# Patient Record
Sex: FEMALE | Race: OTHER | NOT HISPANIC OR LATINO | URBAN - METROPOLITAN AREA
[De-identification: names, ages, dates, MRNs, and addresses within clinical notes are randomized per-mention and may not be internally consistent; named-entity substitution may affect disease eponyms.]

---

## 2017-09-08 ENCOUNTER — OUTPATIENT (OUTPATIENT)
Dept: OUTPATIENT SERVICES | Facility: HOSPITAL | Age: 72
LOS: 1 days | End: 2017-09-08
Payer: MEDICARE

## 2017-09-08 DIAGNOSIS — Z52.000: ICD-10-CM

## 2017-09-08 PROCEDURE — 86901 BLOOD TYPING SEROLOGIC RH(D): CPT

## 2017-09-08 PROCEDURE — 36415 COLL VENOUS BLD VENIPUNCTURE: CPT

## 2017-09-08 PROCEDURE — 86850 RBC ANTIBODY SCREEN: CPT

## 2017-09-08 PROCEDURE — 86900 BLOOD TYPING SEROLOGIC ABO: CPT

## 2019-05-29 ENCOUNTER — APPOINTMENT (OUTPATIENT)
Dept: UROGYNECOLOGY | Facility: CLINIC | Age: 74
End: 2019-05-29
Payer: MEDICARE

## 2019-05-29 VITALS
BODY MASS INDEX: 23.95 KG/M2 | SYSTOLIC BLOOD PRESSURE: 126 MMHG | WEIGHT: 122 LBS | HEIGHT: 60 IN | DIASTOLIC BLOOD PRESSURE: 80 MMHG

## 2019-05-29 DIAGNOSIS — N39.46 MIXED INCONTINENCE: ICD-10-CM

## 2019-05-29 DIAGNOSIS — R35.0 FREQUENCY OF MICTURITION: ICD-10-CM

## 2019-05-29 DIAGNOSIS — N99.3 PROLAPSE OF VAGINAL VAULT AFTER HYSTERECTOMY: ICD-10-CM

## 2019-05-29 DIAGNOSIS — R39.15 URGENCY OF URINATION: ICD-10-CM

## 2019-05-29 LAB
BILIRUB UR QL STRIP: NORMAL
CLARITY UR: CLEAR
COLLECTION METHOD: NORMAL
GLUCOSE UR-MCNC: NORMAL
HCG UR QL: 0.2 EU/DL
HGB UR QL STRIP.AUTO: NORMAL
KETONES UR-MCNC: NORMAL
LEUKOCYTE ESTERASE UR QL STRIP: NORMAL
NITRITE UR QL STRIP: NORMAL
PH UR STRIP: 6.5
PROT UR STRIP-MCNC: NORMAL
SP GR UR STRIP: 1.01

## 2019-05-29 PROCEDURE — 51701 INSERT BLADDER CATHETER: CPT

## 2019-05-29 PROCEDURE — 99204 OFFICE O/P NEW MOD 45 MIN: CPT | Mod: 25

## 2019-05-29 NOTE — HISTORY OF PRESENT ILLNESS
[Rectal Prolapse] : none [Urinary Frequency] : none [Constipation Obstructed Defecation] : none [Feelings Of Urinary Urgency] : daily [x3+] : three or more  times a night [Urinary Tract Infection] : daily [Unable To Restrain Bowel Movement] : rare [Incomplete Emptying Of Stool] : none [Stool Visible Blood] : none [] : none [Sexual Dysfunction, NOS] : none [de-identified] : uses 2 pads daily [FreeTextEntry7] : has daily BMs and doesn’t need to strain once she inserts fingers in rectum to start BM [de-identified] : sexually active [FreeTextEntry1] : \par PMH: DM2, HLD\par PSH: total abdominal hysterectomy and appendectomy for ruptured appendix 1980s, benign papilloma \par Social History: currently retired, \par \par daily fluid intake: 24 oz coffee, 24 oz monet water, soda, 2 c water, 1 c OJ

## 2019-05-29 NOTE — DISCUSSION/SUMMARY
[FreeTextEntry1] : \par 1. vaginal vault prolapse: Computer generated images were used to demonstrate her prolapse as well as explain treatment options. We reviewed management options for her prolapse including: observation, pelvic floor exercises, pessary, and surgical management. We reviewed various surgical management options including vaginal, laparoscopic/robotic, abdominal procedures. She would like to try a pessary and will RTO for pessary fitting. We will fit with a pessary she can remove/insert on her own as she is sexually active. Will try ring with support, donut vs cube pessary. \par \par 2. Mixed urinary incontinence, predominant urgency component: We discussed possible etiologies of her symptoms including both stress urinary incontinence and overactive bladder. I recommend she start behavioral and fluid modifications. We will also reassess her urinary symptoms once the prolapse is reduced. If overactive bladder/UUI symptoms persist, will consider medications. If she opts for surgery in future, will proceed with urodynamics prior to surgical planning. \par \par 3. Will reassess defecatory symptoms (digitation) with prolapse reduced

## 2019-05-29 NOTE — PHYSICAL EXAM
[No Acute Distress] : in no acute distress [Oriented x3] : oriented to person, place, and time [Normal Memory] : ~T memory was ~L unimpaired [Warm and Dry] : was warm and dry to touch [Normal Gait] : gait was normal [Vulvar Atrophy] : vulvar atrophy [Labia Majora] : were normal [Labia Minora] : were normal [Atrophy] : atrophy [No Bleeding] : there was no active vaginal bleeding [Normal] : no abnormalities [Aa ____] : Aa [unfilled] [Ba ____] : Ba [unfilled] [GH ____] : GH [unfilled] [C ____] : C [unfilled] [TVL ____] : TVL  [unfilled] [PB ____] : PB [unfilled] [Ap ____] : Ap [unfilled] [Bp ____] : Bp [unfilled] [Absent] : absent [Tenderness] : ~T no ~M abdominal tenderness observed [Distended] : not distended [H/Smegaly] : no hepatosplenomegaly [Inguinal LAD] : no adenopathy was noted in the inguinal lymph nodes

## 2019-05-30 ENCOUNTER — RECORD ABSTRACTING (OUTPATIENT)
Age: 74
End: 2019-05-30

## 2019-05-30 ENCOUNTER — RESULT REVIEW (OUTPATIENT)
Age: 74
End: 2019-05-30

## 2019-05-30 DIAGNOSIS — Z63.4 DISAPPEARANCE AND DEATH OF FAMILY MEMBER: ICD-10-CM

## 2019-05-30 DIAGNOSIS — Z78.9 OTHER SPECIFIED HEALTH STATUS: ICD-10-CM

## 2019-05-30 DIAGNOSIS — Z83.3 FAMILY HISTORY OF DIABETES MELLITUS: ICD-10-CM

## 2019-05-30 DIAGNOSIS — Z86.39 PERSONAL HISTORY OF OTHER ENDOCRINE, NUTRITIONAL AND METABOLIC DISEASE: ICD-10-CM

## 2019-05-30 DIAGNOSIS — Z82.5 FAMILY HISTORY OF ASTHMA AND OTHER CHRONIC LOWER RESPIRATORY DISEASES: ICD-10-CM

## 2019-05-30 LAB
APPEARANCE: CLEAR
BACTERIA: NEGATIVE
BILIRUBIN URINE: NEGATIVE
BLOOD URINE: NEGATIVE
COLOR: NORMAL
GLUCOSE QUALITATIVE U: NEGATIVE
HYALINE CASTS: 0 /LPF
KETONES URINE: NEGATIVE
LEUKOCYTE ESTERASE URINE: NEGATIVE
MICROSCOPIC-UA: NORMAL
NITRITE URINE: NEGATIVE
PH URINE: 6.5
PROTEIN URINE: NEGATIVE
RED BLOOD CELLS URINE: 2 /HPF
SPECIFIC GRAVITY URINE: 1.02
SQUAMOUS EPITHELIAL CELLS: 0 /HPF
UROBILINOGEN URINE: NORMAL
WHITE BLOOD CELLS URINE: 0 /HPF

## 2019-05-30 RX ORDER — MULTIVITAMIN
TABLET ORAL
Refills: 0 | Status: ACTIVE | COMMUNITY

## 2019-05-30 RX ORDER — ASPIRIN 81 MG
600-200 TABLET, DELAYED RELEASE (ENTERIC COATED) ORAL
Refills: 0 | Status: ACTIVE | COMMUNITY

## 2019-05-30 SDOH — SOCIAL STABILITY - SOCIAL INSECURITY: DISSAPEARANCE AND DEATH OF FAMILY MEMBER: Z63.4

## 2019-05-31 ENCOUNTER — RESULT REVIEW (OUTPATIENT)
Age: 74
End: 2019-05-31

## 2019-06-27 ENCOUNTER — MESSAGE (OUTPATIENT)
Age: 74
End: 2019-06-27

## 2019-07-10 ENCOUNTER — APPOINTMENT (OUTPATIENT)
Dept: UROGYNECOLOGY | Facility: CLINIC | Age: 74
End: 2019-07-10

## 2019-11-26 LAB — BACTERIA UR CULT: NORMAL

## 2021-04-09 DIAGNOSIS — Z01.818 ENCOUNTER FOR OTHER PREPROCEDURAL EXAMINATION: ICD-10-CM

## 2021-04-10 ENCOUNTER — APPOINTMENT (OUTPATIENT)
Dept: DISASTER EMERGENCY | Facility: CLINIC | Age: 76
End: 2021-04-10

## 2022-04-19 ENCOUNTER — APPOINTMENT (OUTPATIENT)
Dept: ORTHOPEDIC SURGERY | Facility: CLINIC | Age: 77
End: 2022-04-19

## 2023-01-23 ENCOUNTER — EMERGENCY (EMERGENCY)
Facility: HOSPITAL | Age: 78
LOS: 1 days | Discharge: HOME | End: 2023-01-23
Attending: STUDENT IN AN ORGANIZED HEALTH CARE EDUCATION/TRAINING PROGRAM
Payer: MEDICARE

## 2023-01-23 VITALS
HEART RATE: 106 BPM | OXYGEN SATURATION: 99 % | DIASTOLIC BLOOD PRESSURE: 78 MMHG | RESPIRATION RATE: 16 BRPM | SYSTOLIC BLOOD PRESSURE: 145 MMHG | TEMPERATURE: 99 F | WEIGHT: 125 LBS

## 2023-01-23 PROCEDURE — 99285 EMERGENCY DEPT VISIT HI MDM: CPT

## 2023-01-24 VITALS
OXYGEN SATURATION: 100 % | RESPIRATION RATE: 16 BRPM | SYSTOLIC BLOOD PRESSURE: 120 MMHG | TEMPERATURE: 99 F | HEART RATE: 95 BPM | DIASTOLIC BLOOD PRESSURE: 66 MMHG

## 2023-01-24 LAB
A1C WITH ESTIMATED AVERAGE GLUCOSE RESULT: 6.8 % — HIGH (ref 4–5.6)
ALBUMIN SERPL ELPH-MCNC: 3.8 G/DL — SIGNIFICANT CHANGE UP (ref 3.3–5)
ALP SERPL-CCNC: 157 U/L — HIGH (ref 40–120)
ALT FLD-CCNC: 25 U/L — SIGNIFICANT CHANGE UP (ref 10–45)
ANION GAP SERPL CALC-SCNC: 12 MMOL/L — SIGNIFICANT CHANGE UP (ref 5–17)
APPEARANCE UR: ABNORMAL
AST SERPL-CCNC: 25 U/L — SIGNIFICANT CHANGE UP (ref 10–40)
B-OH-BUTYR SERPL-SCNC: 0.4 MMOL/L — SIGNIFICANT CHANGE UP
BACTERIA # UR AUTO: ABNORMAL
BASE EXCESS BLDV CALC-SCNC: -0.2 MMOL/L — SIGNIFICANT CHANGE UP (ref -2–3)
BASOPHILS # BLD AUTO: 0.02 K/UL — SIGNIFICANT CHANGE UP (ref 0–0.2)
BASOPHILS NFR BLD AUTO: 0.2 % — SIGNIFICANT CHANGE UP (ref 0–2)
BILIRUB SERPL-MCNC: 0.5 MG/DL — SIGNIFICANT CHANGE UP (ref 0.2–1.2)
BILIRUB UR-MCNC: NEGATIVE — SIGNIFICANT CHANGE UP
BLOOD GAS VENOUS - CREATININE: SIGNIFICANT CHANGE UP MG/DL (ref 0.5–1.3)
BUN SERPL-MCNC: 24 MG/DL — HIGH (ref 7–23)
CA-I SERPL-SCNC: 1.08 MMOL/L — LOW (ref 1.15–1.33)
CALCIUM SERPL-MCNC: 9.4 MG/DL — SIGNIFICANT CHANGE UP (ref 8.4–10.5)
CHLORIDE BLDV-SCNC: 109 MMOL/L — HIGH (ref 96–108)
CHLORIDE SERPL-SCNC: 107 MMOL/L — SIGNIFICANT CHANGE UP (ref 96–108)
CO2 BLDV-SCNC: 27 MMOL/L — HIGH (ref 22–26)
CO2 SERPL-SCNC: 22 MMOL/L — SIGNIFICANT CHANGE UP (ref 22–31)
COLOR SPEC: ABNORMAL
CREAT SERPL-MCNC: 0.9 MG/DL — SIGNIFICANT CHANGE UP (ref 0.5–1.3)
DIFF PNL FLD: ABNORMAL
EGFR: 65 ML/MIN/1.73M2 — SIGNIFICANT CHANGE UP
EOSINOPHIL # BLD AUTO: 0 K/UL — SIGNIFICANT CHANGE UP (ref 0–0.5)
EOSINOPHIL NFR BLD AUTO: 0 % — SIGNIFICANT CHANGE UP (ref 0–6)
EPI CELLS # UR: 7 /HPF — HIGH
ESTIMATED AVERAGE GLUCOSE: 148 MG/DL — HIGH (ref 68–114)
GAS PNL BLDV: 133 MMOL/L — LOW (ref 136–145)
GAS PNL BLDV: SIGNIFICANT CHANGE UP
GAS PNL BLDV: SIGNIFICANT CHANGE UP
GLUCOSE BLDV-MCNC: 157 MG/DL — HIGH (ref 70–99)
GLUCOSE SERPL-MCNC: 168 MG/DL — HIGH (ref 70–99)
GLUCOSE UR QL: ABNORMAL
HCO3 BLDV-SCNC: 25 MMOL/L — SIGNIFICANT CHANGE UP (ref 22–29)
HCT VFR BLD CALC: 40.9 % — SIGNIFICANT CHANGE UP (ref 34.5–45)
HCT VFR BLDA CALC: 40 % — SIGNIFICANT CHANGE UP (ref 34.5–46.5)
HGB BLD CALC-MCNC: 13.3 G/DL — SIGNIFICANT CHANGE UP (ref 11.7–16.1)
HGB BLD-MCNC: 13.1 G/DL — SIGNIFICANT CHANGE UP (ref 11.5–15.5)
HYALINE CASTS # UR AUTO: 4 /LPF — HIGH (ref 0–2)
IMM GRANULOCYTES NFR BLD AUTO: 0.5 % — SIGNIFICANT CHANGE UP (ref 0–0.9)
KETONES UR-MCNC: ABNORMAL
LACTATE BLDV-MCNC: 1.8 MMOL/L — SIGNIFICANT CHANGE UP (ref 0.5–2)
LEUKOCYTE ESTERASE UR-ACNC: ABNORMAL
LYMPHOCYTES # BLD AUTO: 0.88 K/UL — LOW (ref 1–3.3)
LYMPHOCYTES # BLD AUTO: 6.7 % — LOW (ref 13–44)
MAGNESIUM SERPL-MCNC: 2.4 MG/DL — SIGNIFICANT CHANGE UP (ref 1.6–2.6)
MCHC RBC-ENTMCNC: 28 PG — SIGNIFICANT CHANGE UP (ref 27–34)
MCHC RBC-ENTMCNC: 32 GM/DL — SIGNIFICANT CHANGE UP (ref 32–36)
MCV RBC AUTO: 87.4 FL — SIGNIFICANT CHANGE UP (ref 80–100)
MONOCYTES # BLD AUTO: 1.11 K/UL — HIGH (ref 0–0.9)
MONOCYTES NFR BLD AUTO: 8.5 % — SIGNIFICANT CHANGE UP (ref 2–14)
NEUTROPHILS # BLD AUTO: 11.01 K/UL — HIGH (ref 1.8–7.4)
NEUTROPHILS NFR BLD AUTO: 84.1 % — HIGH (ref 43–77)
NITRITE UR-MCNC: POSITIVE
NRBC # BLD: 0 /100 WBCS — SIGNIFICANT CHANGE UP (ref 0–0)
PCO2 BLDV: 44 MMHG — HIGH (ref 39–42)
PH BLDV: 7.37 — SIGNIFICANT CHANGE UP (ref 7.32–7.43)
PH UR: 6 — SIGNIFICANT CHANGE UP (ref 5–8)
PHOSPHATE SERPL-MCNC: 2.3 MG/DL — LOW (ref 2.5–4.5)
PLATELET # BLD AUTO: 209 K/UL — SIGNIFICANT CHANGE UP (ref 150–400)
PO2 BLDV: 26 MMHG — SIGNIFICANT CHANGE UP (ref 25–45)
POTASSIUM BLDV-SCNC: >10 MMOL/L — CRITICAL HIGH (ref 3.5–5.1)
POTASSIUM SERPL-MCNC: 4.6 MMOL/L — SIGNIFICANT CHANGE UP (ref 3.5–5.3)
POTASSIUM SERPL-SCNC: 4.6 MMOL/L — SIGNIFICANT CHANGE UP (ref 3.5–5.3)
PROT SERPL-MCNC: 8 G/DL — SIGNIFICANT CHANGE UP (ref 6–8.3)
PROT UR-MCNC: ABNORMAL
RAPID RVP RESULT: SIGNIFICANT CHANGE UP
RBC # BLD: 4.68 M/UL — SIGNIFICANT CHANGE UP (ref 3.8–5.2)
RBC # FLD: 14.9 % — HIGH (ref 10.3–14.5)
RBC CASTS # UR COMP ASSIST: 15 /HPF — HIGH (ref 0–4)
SAO2 % BLDV: 43.3 % — LOW (ref 67–88)
SARS-COV-2 RNA SPEC QL NAA+PROBE: SIGNIFICANT CHANGE UP
SODIUM SERPL-SCNC: 141 MMOL/L — SIGNIFICANT CHANGE UP (ref 135–145)
SP GR SPEC: 1.02 — SIGNIFICANT CHANGE UP (ref 1.01–1.02)
TROPONIN T, HIGH SENSITIVITY RESULT: 36 NG/L — SIGNIFICANT CHANGE UP (ref 0–51)
UROBILINOGEN FLD QL: NEGATIVE — SIGNIFICANT CHANGE UP
WBC # BLD: 13.09 K/UL — HIGH (ref 3.8–10.5)
WBC # FLD AUTO: 13.09 K/UL — HIGH (ref 3.8–10.5)
WBC UR QL: 641 /HPF — HIGH (ref 0–5)

## 2023-01-24 PROCEDURE — 36415 COLL VENOUS BLD VENIPUNCTURE: CPT

## 2023-01-24 PROCEDURE — 82330 ASSAY OF CALCIUM: CPT

## 2023-01-24 PROCEDURE — 85018 HEMOGLOBIN: CPT

## 2023-01-24 PROCEDURE — 82803 BLOOD GASES ANY COMBINATION: CPT

## 2023-01-24 PROCEDURE — 83036 HEMOGLOBIN GLYCOSYLATED A1C: CPT

## 2023-01-24 PROCEDURE — 85025 COMPLETE CBC W/AUTO DIFF WBC: CPT

## 2023-01-24 PROCEDURE — 83735 ASSAY OF MAGNESIUM: CPT

## 2023-01-24 PROCEDURE — 82947 ASSAY GLUCOSE BLOOD QUANT: CPT

## 2023-01-24 PROCEDURE — 80053 COMPREHEN METABOLIC PANEL: CPT

## 2023-01-24 PROCEDURE — 71046 X-RAY EXAM CHEST 2 VIEWS: CPT

## 2023-01-24 PROCEDURE — 84132 ASSAY OF SERUM POTASSIUM: CPT

## 2023-01-24 PROCEDURE — 82565 ASSAY OF CREATININE: CPT

## 2023-01-24 PROCEDURE — 0225U NFCT DS DNA&RNA 21 SARSCOV2: CPT

## 2023-01-24 PROCEDURE — 84484 ASSAY OF TROPONIN QUANT: CPT

## 2023-01-24 PROCEDURE — 84295 ASSAY OF SERUM SODIUM: CPT

## 2023-01-24 PROCEDURE — 85014 HEMATOCRIT: CPT

## 2023-01-24 PROCEDURE — 87186 SC STD MICRODIL/AGAR DIL: CPT

## 2023-01-24 PROCEDURE — 99285 EMERGENCY DEPT VISIT HI MDM: CPT | Mod: 25

## 2023-01-24 PROCEDURE — 87086 URINE CULTURE/COLONY COUNT: CPT

## 2023-01-24 PROCEDURE — 71046 X-RAY EXAM CHEST 2 VIEWS: CPT | Mod: 26

## 2023-01-24 PROCEDURE — 81001 URINALYSIS AUTO W/SCOPE: CPT

## 2023-01-24 PROCEDURE — 84100 ASSAY OF PHOSPHORUS: CPT

## 2023-01-24 PROCEDURE — 83605 ASSAY OF LACTIC ACID: CPT

## 2023-01-24 PROCEDURE — 82435 ASSAY OF BLOOD CHLORIDE: CPT

## 2023-01-24 PROCEDURE — 93005 ELECTROCARDIOGRAM TRACING: CPT

## 2023-01-24 PROCEDURE — 82962 GLUCOSE BLOOD TEST: CPT

## 2023-01-24 PROCEDURE — 96374 THER/PROPH/DIAG INJ IV PUSH: CPT

## 2023-01-24 PROCEDURE — 82010 KETONE BODYS QUAN: CPT

## 2023-01-24 RX ORDER — CEFDINIR 250 MG/5ML
1 POWDER, FOR SUSPENSION ORAL
Qty: 20 | Refills: 0
Start: 2023-01-24 | End: 2023-02-02

## 2023-01-24 RX ORDER — SODIUM CHLORIDE 9 MG/ML
1000 INJECTION INTRAMUSCULAR; INTRAVENOUS; SUBCUTANEOUS ONCE
Refills: 0 | Status: COMPLETED | OUTPATIENT
Start: 2023-01-24 | End: 2023-01-24

## 2023-01-24 RX ORDER — CEFTRIAXONE 500 MG/1
1000 INJECTION, POWDER, FOR SOLUTION INTRAMUSCULAR; INTRAVENOUS ONCE
Refills: 0 | Status: COMPLETED | OUTPATIENT
Start: 2023-01-24 | End: 2023-01-24

## 2023-01-24 RX ORDER — CEFDINIR 250 MG/5ML
1 POWDER, FOR SUSPENSION ORAL
Qty: 28 | Refills: 0
Start: 2023-01-24 | End: 2023-02-06

## 2023-01-24 RX ADMIN — CEFTRIAXONE 100 MILLIGRAM(S): 500 INJECTION, POWDER, FOR SOLUTION INTRAMUSCULAR; INTRAVENOUS at 05:37

## 2023-01-24 RX ADMIN — SODIUM CHLORIDE 1000 MILLILITER(S): 9 INJECTION INTRAMUSCULAR; INTRAVENOUS; SUBCUTANEOUS at 03:08

## 2023-01-24 NOTE — ED ADULT NURSE NOTE - OBJECTIVE STATEMENT
77y female PMH DM on metformin to the ED from home via triage c/o of generalized weakness. Pt reports 5 days of weakness and trouble with ambulation. Pt reports that when pt is walking up the stairs pt has "to grab onto the railing." Pt reports eating a little less than normal.  Stretcher in lowest position and locked, appropriate side rails in place, room cleared of clutter and safety hazards, call bell in reach- pt oriented to use, blankets given for comfort 77y female PMH DM on metformin to the ED from home via triage c/o of generalized weakness. Pt reports 5 days of weakness and trouble with ambulation. Pt reports that when pt is walking up the stairs pt has "to grab onto the railing." Pt reports eating a little less than normal but ate today. Pt also reports urinary frequency and a little burning with urination in the morning, urine is also darker than normal. Pt denies chest pain, palpitations, shortness of breath, headache, visual disturbances, numbness/tingling, fever, chills, diaphoresis,  nausea, vomiting, constipation, diarrhea, Stretcher in lowest position and locked, appropriate side rails in place, room cleared of clutter and safety hazards, call bell in reach- pt oriented to use, blankets given for comfort.

## 2023-01-24 NOTE — ED PROVIDER NOTE - ATTENDING CONTRIBUTION TO CARE
Attending (Julien Garcia M.D.):  I have personally seen and examined this patient. I have performed a substantive portion of the visit including all aspects of the medical decision making. Resident and/or ACP note reviewed. I agree on the plan of care except where noted.    76 yo F hx DM2 p/w fatigue, weakness and dysuria. No f/c, n/v/d, abd pain, flank pain, hematuria, hx renal stones. No cp, sob, HA. Tolerating pO at baseline.   Workup reveals UTI. Pt able to tolerate PO abx. Remainder of labs unactinoable and at baseline. Will dc with return precautions given.

## 2023-01-24 NOTE — ED PROVIDER NOTE - PATIENT PORTAL LINK FT
You can access the FollowMyHealth Patient Portal offered by [facility] by registering at the following website: [email or web address]. By joining [employer]’s FollowMyHealth portal, you will also be able to view your health information using other applications (apps) compatible with our system.

## 2023-01-24 NOTE — ED PROVIDER NOTE - RAPID ASSESSMENT
Dr. Norris (Attending Physician)  Pt. with ho DM pw gen weakness, lightheadedness, unable to walk independently, increased urinary frequency, chills. Denies dysuria, chest pain, abd. pain, fever. Will check FS, labs, vbg, RVP, UA, trop, cxr.    I saw this patient in Tele-Triage/QDoc. I agree with the scribe's documentation. Full H&P/assessment to be performed in Emergency Department and will follow-up on any labs/studies ordered.

## 2023-01-24 NOTE — ED PROVIDER NOTE - OBJECTIVE STATEMENT
Dr. Norris (Attending Physician)  Pt. with ho DM pw gen weakness, lightheadedness, unable to walk independently, increased urinary frequency, chills. Denies dysuria, chest pain, abd. pain, fever. Will check FS, labs, vbg, RVP, UA, trop, cxr.    I saw this patient in Tele-Triage/QDoc. I agree with the scribe's documentation. Full H&P/assessment to be performed in Emergency Department and will follow-up on any labs/studies ordered. Dr. Norris (Attending Physician)  Pt. with ho DM pw gen weakness, lightheadedness, unable to walk independently, increased urinary frequency, chills. Denies dysuria, chest pain, abd. pain, fever. Will check FS, labs, vbg, RVP, UA, trop, cxr.    I saw this patient in Tele-Triage/QDoc. I agree with the scribe's documentation. Full H&P/assessment to be performed in Emergency Department and will follow-up on any labs/studies ordered.      77-year-old female with past medical history of diabetes type 2, on metformin, presenting with 5 days of fatigue, and generalized weakness.  Patient reports that she has been having less energy to do her tasks at home such as laundry and cleaning, however has been able to ambulate.  Patient lives at home with daughter. She reports she has been also having dysuria intermittently over the last 5 days.  Patient denies any fever/chills, nausea/vomiting/diarrhea, abdominal pain, chest pain/shortness of breath, lower extremity edema, hematuria, flank pain. Has been able to eat and drink at home. 77-year-old female with past medical history of diabetes type 2, on metformin, presenting with 5 days of fatigue, and generalized weakness.  Patient reports that she has been having less energy to do her tasks at home such as laundry and cleaning, however has been able to ambulate.  Patient lives at home with daughter. She reports she has been also having dysuria intermittently over the last 5 days.  Patient denies any fever/chills, nausea/vomiting/diarrhea, abdominal pain, chest pain/shortness of breath, lower extremity edema, hematuria, flank pain. Has been able to eat and drink at home.

## 2023-01-24 NOTE — ED PROVIDER NOTE - CLINICAL SUMMARY MEDICAL DECISION MAKING FREE TEXT BOX
77-year-old female with past medical history of diabetes type 2, on metformin, presenting with 5 days of fatigue, and generalized weakness.  Patient reports that she has been having less energy to do her tasks at home such as laundry and cleaning, however has been able to ambulate.  Patient lives at home with daughter. She reports she has been also having dysuria intermittently over the last 5 days.  Patient denies any fever/chills, nausea/vomiting/diarrhea, abdominal pain, chest pain/shortness of breath, lower extremity edema, hematuria, flank pain. Has been able to eat and drink at home.    Vital signs show tachycardia at 95 bpm, otherwise normal vital signs.  Physical exam shows no abdominal tenderness or suprapubic tenderness. moist mucous membranes.  Normal heart and lung sounds, no lower extremity edema or JVD.  Patient is able to ambulate.  High suspicion for viral illness versus UTI versus DKA.  We will also assess for ACS and pneumonia.  Plan: CBC, CMP, VBG, mag/Phos, RVP, troponin, UA/UC, x-ray, EKG

## 2023-01-24 NOTE — ED PROVIDER NOTE - PHYSICAL EXAMINATION
VITALS:   T(C): 37 (01-24-23 @ 05:24), Max: 37.4 (01-23-23 @ 23:25)  HR: 95 (01-24-23 @ 05:24) (95 - 106)  BP: 120/66 (01-24-23 @ 05:24) (120/66 - 145/78)  RR: 16 (01-24-23 @ 05:24) (16 - 18)  SpO2: 100% (01-24-23 @ 05:24) (97% - 100%)    GENERAL: NAD, lying in bed comfortably  HEAD:  Atraumatic, Normocephalic  EYES: EOMI, conjunctiva and sclera clear  ENT: Moist mucous membranes  NECK: Supple, No JVD  CHEST/LUNG: Clear to auscultation bilaterally; No rales, rhonchi, wheezing, or rubs. Unlabored respirations  BACK: No CVA tenderness  HEART: Regular rate and rhythm; No murmurs, rubs, or gallops  ABDOMEN: Soft, nontender, nondistended  EXTREMITIES:  No LE edema  NERVOUS SYSTEM:  A&Ox3, no focal deficits, able to ambulate  SKIN: No rashes or lesions

## 2023-01-24 NOTE — ED PROVIDER NOTE - NSFOLLOWUPINSTRUCTIONS_ED_ALL_ED_FT
Please follow up with your primary care doctor within 3 days.     Take your prescribed antibiotic, stay hydrated and eat a balanced diet.     Please come back for any worsening or concerning symptoms. This include sudden back pain, fevers, vomiting, inability to walk, inability to eat, chest pain, shortness of breath, or any other worsening or concerning symptoms

## 2023-01-24 NOTE — ED PROVIDER NOTE - PROGRESS NOTE DETAILS
Dalia Suresh, PGY-1: Pt able to ambulate and passed PO challenge  Given dose of CTX for UTI. Cefdinir sent to pharmacy

## 2023-01-26 NOTE — ED POST DISCHARGE NOTE - RESULT SUMMARY
1/26/23: UCX prelim >100k E. coli, pt DC'ed on cefdinir, pending sensitivities to determine if need for call back vs appropriate care received. -Bhupinder Fischer PA-C

## 2023-01-26 NOTE — ED POST DISCHARGE NOTE - ADDITIONAL DOCUMENTATION
1/27/23- Anthony PA- sensitivities show that cefdinir is appropriate medication to treat this patient's e.coli UTI. no further intervention needed.

## 2023-03-02 ENCOUNTER — EMERGENCY (EMERGENCY)
Facility: HOSPITAL | Age: 78
LOS: 1 days | Discharge: HOME | End: 2023-03-02
Attending: EMERGENCY MEDICINE
Payer: MEDICARE

## 2023-03-02 VITALS
TEMPERATURE: 98 F | HEART RATE: 94 BPM | DIASTOLIC BLOOD PRESSURE: 105 MMHG | RESPIRATION RATE: 19 BRPM | WEIGHT: 121.92 LBS | SYSTOLIC BLOOD PRESSURE: 196 MMHG | OXYGEN SATURATION: 98 % | HEIGHT: 61 IN

## 2023-03-02 PROCEDURE — 99284 EMERGENCY DEPT VISIT MOD MDM: CPT

## 2023-03-02 NOTE — ED ADULT TRIAGE NOTE - NSTRIAGECARE_GEN_A_ER
Have Your Spot(S) Been Treated In The Past?: has not been treated Hpi Title: Evaluation of Skin Lesions Family Member: Grandfather Face Mask

## 2023-03-03 VITALS
OXYGEN SATURATION: 99 % | TEMPERATURE: 98 F | HEART RATE: 85 BPM | SYSTOLIC BLOOD PRESSURE: 173 MMHG | DIASTOLIC BLOOD PRESSURE: 97 MMHG | RESPIRATION RATE: 17 BRPM

## 2023-03-03 LAB
ALBUMIN SERPL ELPH-MCNC: 4.2 G/DL — SIGNIFICANT CHANGE UP (ref 3.3–5)
ALP SERPL-CCNC: 160 U/L — HIGH (ref 40–120)
ALT FLD-CCNC: 13 U/L — SIGNIFICANT CHANGE UP (ref 10–45)
ANION GAP SERPL CALC-SCNC: 8 MMOL/L — SIGNIFICANT CHANGE UP (ref 5–17)
APTT BLD: 29.2 SEC — SIGNIFICANT CHANGE UP (ref 27.5–35.5)
AST SERPL-CCNC: 17 U/L — SIGNIFICANT CHANGE UP (ref 10–40)
BASOPHILS # BLD AUTO: 0.03 K/UL — SIGNIFICANT CHANGE UP (ref 0–0.2)
BASOPHILS NFR BLD AUTO: 0.3 % — SIGNIFICANT CHANGE UP (ref 0–2)
BILIRUB SERPL-MCNC: 0.1 MG/DL — LOW (ref 0.2–1.2)
BLD GP AB SCN SERPL QL: NEGATIVE — SIGNIFICANT CHANGE UP
BUN SERPL-MCNC: 18 MG/DL — SIGNIFICANT CHANGE UP (ref 7–23)
CALCIUM SERPL-MCNC: 9.3 MG/DL — SIGNIFICANT CHANGE UP (ref 8.4–10.5)
CHLORIDE SERPL-SCNC: 102 MMOL/L — SIGNIFICANT CHANGE UP (ref 96–108)
CO2 SERPL-SCNC: 28 MMOL/L — SIGNIFICANT CHANGE UP (ref 22–31)
CREAT SERPL-MCNC: 0.75 MG/DL — SIGNIFICANT CHANGE UP (ref 0.5–1.3)
EGFR: 81 ML/MIN/1.73M2 — SIGNIFICANT CHANGE UP
EOSINOPHIL # BLD AUTO: 0.07 K/UL — SIGNIFICANT CHANGE UP (ref 0–0.5)
EOSINOPHIL NFR BLD AUTO: 0.8 % — SIGNIFICANT CHANGE UP (ref 0–6)
GLUCOSE SERPL-MCNC: 175 MG/DL — HIGH (ref 70–99)
HCT VFR BLD CALC: 39.3 % — SIGNIFICANT CHANGE UP (ref 34.5–45)
HGB BLD-MCNC: 12.6 G/DL — SIGNIFICANT CHANGE UP (ref 11.5–15.5)
IMM GRANULOCYTES NFR BLD AUTO: 0.3 % — SIGNIFICANT CHANGE UP (ref 0–0.9)
INR BLD: 0.95 RATIO — SIGNIFICANT CHANGE UP (ref 0.88–1.16)
LYMPHOCYTES # BLD AUTO: 1.92 K/UL — SIGNIFICANT CHANGE UP (ref 1–3.3)
LYMPHOCYTES # BLD AUTO: 21.9 % — SIGNIFICANT CHANGE UP (ref 13–44)
MCHC RBC-ENTMCNC: 28.1 PG — SIGNIFICANT CHANGE UP (ref 27–34)
MCHC RBC-ENTMCNC: 32.1 GM/DL — SIGNIFICANT CHANGE UP (ref 32–36)
MCV RBC AUTO: 87.7 FL — SIGNIFICANT CHANGE UP (ref 80–100)
MONOCYTES # BLD AUTO: 0.82 K/UL — SIGNIFICANT CHANGE UP (ref 0–0.9)
MONOCYTES NFR BLD AUTO: 9.4 % — SIGNIFICANT CHANGE UP (ref 2–14)
NEUTROPHILS # BLD AUTO: 5.88 K/UL — SIGNIFICANT CHANGE UP (ref 1.8–7.4)
NEUTROPHILS NFR BLD AUTO: 67.3 % — SIGNIFICANT CHANGE UP (ref 43–77)
NRBC # BLD: 0 /100 WBCS — SIGNIFICANT CHANGE UP (ref 0–0)
PLATELET # BLD AUTO: 248 K/UL — SIGNIFICANT CHANGE UP (ref 150–400)
POTASSIUM SERPL-MCNC: 3.8 MMOL/L — SIGNIFICANT CHANGE UP (ref 3.5–5.3)
POTASSIUM SERPL-SCNC: 3.8 MMOL/L — SIGNIFICANT CHANGE UP (ref 3.5–5.3)
PROT SERPL-MCNC: 7.7 G/DL — SIGNIFICANT CHANGE UP (ref 6–8.3)
PROTHROM AB SERPL-ACNC: 10.9 SEC — SIGNIFICANT CHANGE UP (ref 10.5–13.4)
RBC # BLD: 4.48 M/UL — SIGNIFICANT CHANGE UP (ref 3.8–5.2)
RBC # FLD: 14.6 % — HIGH (ref 10.3–14.5)
RH IG SCN BLD-IMP: POSITIVE — SIGNIFICANT CHANGE UP
SARS-COV-2 RNA SPEC QL NAA+PROBE: SIGNIFICANT CHANGE UP
SODIUM SERPL-SCNC: 138 MMOL/L — SIGNIFICANT CHANGE UP (ref 135–145)
WBC # BLD: 8.75 K/UL — SIGNIFICANT CHANGE UP (ref 3.8–10.5)
WBC # FLD AUTO: 8.75 K/UL — SIGNIFICANT CHANGE UP (ref 3.8–10.5)

## 2023-03-03 PROCEDURE — 80053 COMPREHEN METABOLIC PANEL: CPT

## 2023-03-03 PROCEDURE — 86900 BLOOD TYPING SEROLOGIC ABO: CPT

## 2023-03-03 PROCEDURE — 86850 RBC ANTIBODY SCREEN: CPT

## 2023-03-03 PROCEDURE — 87635 SARS-COV-2 COVID-19 AMP PRB: CPT

## 2023-03-03 PROCEDURE — 86901 BLOOD TYPING SEROLOGIC RH(D): CPT

## 2023-03-03 PROCEDURE — 85610 PROTHROMBIN TIME: CPT

## 2023-03-03 PROCEDURE — 85025 COMPLETE CBC W/AUTO DIFF WBC: CPT

## 2023-03-03 PROCEDURE — 85730 THROMBOPLASTIN TIME PARTIAL: CPT

## 2023-03-03 PROCEDURE — 99284 EMERGENCY DEPT VISIT MOD MDM: CPT

## 2023-03-03 PROCEDURE — 93005 ELECTROCARDIOGRAM TRACING: CPT

## 2023-03-03 RX ORDER — SODIUM CHLORIDE 9 MG/ML
1000 INJECTION INTRAMUSCULAR; INTRAVENOUS; SUBCUTANEOUS ONCE
Refills: 0 | Status: COMPLETED | OUTPATIENT
Start: 2023-03-03 | End: 2023-03-03

## 2023-03-03 RX ADMIN — SODIUM CHLORIDE 1000 MILLILITER(S): 9 INJECTION INTRAMUSCULAR; INTRAVENOUS; SUBCUTANEOUS at 02:05

## 2023-03-03 NOTE — ED PROVIDER NOTE - PHYSICAL EXAMINATION
gen: well appearing  Mentation: AAO x 3  psych: mood appropriate  HEENT: airway patent, conjunctivae clear bilaterally  Cardio: RRR, no m/r/g  Resp: normal BS b/l  GI: soft/nondistended/nontender, rectal exam reveals no blood (Chaperoned by Rafa Zapata RN)  : no CVA tenderness,  prolapsed uterus  Neuro: sensation and motor function grossly intact  Skin: No evidence of rash  MSK: normal movement of all extremities  Lymph/Vasc: no LE edema

## 2023-03-03 NOTE — ED ADULT NURSE NOTE - OBJECTIVE STATEMENT
Pt c/o bright red rectal bleeding that started tonight around 10pm tonight. Pt states she went to urinate and noticed blood in the toilet, denies any pain to rectum or taking any blood thinners. Pt AAOx4, VSS, resp even and unlabored, GCS 15, NAD noted at this time.

## 2023-03-03 NOTE — ED PROVIDER NOTE - OBJECTIVE STATEMENT
77-year-old female with a past medical history of high cholesterol, diabetes, uterine prolapse presenting with Vaginal bleeding. Patient states that she went to the bathroom at 10 PM and noticed blood in the toilet. Patient denies abdominal pain, fevers, nausea, vomiting, chest pain, shortness of breath. Patient initially thought this was rectal bleeding.

## 2023-03-03 NOTE — ED PROVIDER NOTE - ATTENDING CONTRIBUTION TO CARE
attending Ariane: 77yF h/o HLD, DM, chronic vaginal prolapse, prior CASSIUS p/w concern for Vaginal bleeding vs rectal bleeding. Patient states that she went to the bathroom at 10 PM and noticed blood in the toilet. Denies abdominal pain, fevers, nausea, vomiting, chest pain, SOB. Exam with no blood on rectal exam,  exam with prolapsed vaginal tissue that was easily reduced with scant blood. Will obtain labs eval for anemia, reassess. Pt reports not compliant with pessary for prolapse, possible bleeding around prolapsed tissue. If labs nonactionable, anticipate dc with close GYN follow-up and strict return precautions

## 2023-03-03 NOTE — ED PROVIDER NOTE - PATIENT PORTAL LINK FT
You can access the FollowMyHealth Patient Portal offered by [facility] by registering at the following website: [email or web address]. By joining [employer]’s FollowMyHealth portal, you will also be able to view your health information using other applications (apps) compatible with our system.

## 2023-03-03 NOTE — ED PROVIDER NOTE - PROGRESS NOTE DETAILS
Patient still without pain,  hemoglobin stable. Patient instructed to follow-up with her OB/GYN. DO Nelson (PGY-2)

## 2023-03-03 NOTE — ED ADULT NURSE NOTE - NSFALLRSKASSESASSIST_ED_ALL_ED
Discharge Summary     Patient ID:  [facility] [place]  [id number]  71 y.o.  1980    Admit date: 4/23/2020  Discharge date: 4/27/2020    Admitting Physician: Rachel Melgar MD   Attending Physician: Rachel Melgar MD  Discharge Provider: Frantz Frost     Discharge Diagnoses:  Acute psychosis, polysubstance abuse    Admission Condition: fair    Discharged Condition: good    Indication for Admission:     HPI:   Patient is a 37 y.o AA/M who presents with paranoia. In the ER he stated, \"I cannot shake the feeling someone is following me. \" UDS positive for amphetamines and cannabinoids. Last used methamphetamine was \"three days ago. \" BAL negative. Medical history of HTN. Surgical history of ORIF of the left ankle. He has had prior psychiatric hospitalizations on this unit. Did not follow up at his outpatient appointments at [facility]. Is wanting to \"get back on his medications. \" He is currently lying in his bed. He answers most questions falling in and out of sleep. Reports that he doesn't feel safe anywhere. Was recently released from [facility] about 3 weeks ago after being incarcerated for 6 months. Will not elaborate on the charges. Denies suicidal or homicidal ideation at this time. Continues to reports that he is feeling \"really paranoid. \" Feels that his life has been \"taken over by paranoia. \" Endorses that even when he is sober he continues to feel paranoid. Does not feel safe anywhere. Sometimes hears people talking that aren't there. He will not elaborate on the type of voices. Feels that COVID-19 is his main stressors. Energy and concentration have been \"bad. \" Has been awake for a few days while using methamphetamine. Sleep has been poor. Appetite has decreased as well. He continues to fall asleep and will no longer answer questions. Maybe he will be more rested tomorrow. Hospital Course:   Patient was admitted to the adult behavioral health floor and was acclimated to the floor.
no

## 2023-03-03 NOTE — ED PROVIDER NOTE - CLINICAL SUMMARY MEDICAL DECISION MAKING FREE TEXT BOX
77-year-old female with a past medical history of high cholesterol, diabetes, uterine prolapse presenting with vaginal bleeding this evening, initially thought was rectal bleeding, on physical exam patient's uterus is prolapsed with minimal amount of blood. Along with Dr. Thakkar uterus was reduced. CBC, CMP, type and screen, coags, transvaginal ultrasound.

## 2023-03-03 NOTE — ED PROVIDER NOTE - NSFOLLOWUPINSTRUCTIONS_ED_ALL_ED_FT
Please follow-up with your OB/GYN within the next 4 to 6 days.    Please return to the emergency department if you experience any of the following symptoms:    Fever  Chest pain  Difficulty breathing  Abdominal pain  Nausea  Vomiting   Worsening bleeding

## 2023-06-28 ENCOUNTER — INPATIENT (INPATIENT)
Facility: HOSPITAL | Age: 78
LOS: 0 days | Discharge: HOME HEALTH | DRG: 536 | End: 2023-06-29
Attending: STUDENT IN AN ORGANIZED HEALTH CARE EDUCATION/TRAINING PROGRAM | Admitting: STUDENT IN AN ORGANIZED HEALTH CARE EDUCATION/TRAINING PROGRAM
Payer: MEDICARE

## 2023-06-28 VITALS
OXYGEN SATURATION: 98 % | WEIGHT: 123.02 LBS | HEART RATE: 101 BPM | RESPIRATION RATE: 18 BRPM | DIASTOLIC BLOOD PRESSURE: 92 MMHG | SYSTOLIC BLOOD PRESSURE: 152 MMHG | TEMPERATURE: 98 F

## 2023-06-28 DIAGNOSIS — S32.599A OTHER SPECIFIED FRACTURE OF UNSPECIFIED PUBIS, INITIAL ENCOUNTER FOR CLOSED FRACTURE: ICD-10-CM

## 2023-06-28 LAB
ALBUMIN SERPL ELPH-MCNC: 4 G/DL — SIGNIFICANT CHANGE UP (ref 3.3–5)
ALP SERPL-CCNC: 120 U/L — SIGNIFICANT CHANGE UP (ref 40–120)
ALT FLD-CCNC: 19 U/L — SIGNIFICANT CHANGE UP (ref 10–45)
ANION GAP SERPL CALC-SCNC: 13 MMOL/L — SIGNIFICANT CHANGE UP (ref 5–17)
APTT BLD: 25.9 SEC — LOW (ref 27.5–35.5)
AST SERPL-CCNC: 22 U/L — SIGNIFICANT CHANGE UP (ref 10–40)
BASOPHILS # BLD AUTO: 0.02 K/UL — SIGNIFICANT CHANGE UP (ref 0–0.2)
BASOPHILS NFR BLD AUTO: 0.3 % — SIGNIFICANT CHANGE UP (ref 0–2)
BILIRUB SERPL-MCNC: 0.7 MG/DL — SIGNIFICANT CHANGE UP (ref 0.2–1.2)
BLD GP AB SCN SERPL QL: NEGATIVE — SIGNIFICANT CHANGE UP
BUN SERPL-MCNC: 23 MG/DL — SIGNIFICANT CHANGE UP (ref 7–23)
CALCIUM SERPL-MCNC: 9.1 MG/DL — SIGNIFICANT CHANGE UP (ref 8.4–10.5)
CHLORIDE SERPL-SCNC: 103 MMOL/L — SIGNIFICANT CHANGE UP (ref 96–108)
CO2 SERPL-SCNC: 23 MMOL/L — SIGNIFICANT CHANGE UP (ref 22–31)
CREAT SERPL-MCNC: 0.63 MG/DL — SIGNIFICANT CHANGE UP (ref 0.5–1.3)
EGFR: 90 ML/MIN/1.73M2 — SIGNIFICANT CHANGE UP
EOSINOPHIL # BLD AUTO: 0.08 K/UL — SIGNIFICANT CHANGE UP (ref 0–0.5)
EOSINOPHIL NFR BLD AUTO: 1.1 % — SIGNIFICANT CHANGE UP (ref 0–6)
GLUCOSE SERPL-MCNC: 172 MG/DL — HIGH (ref 70–99)
HCT VFR BLD CALC: 35.2 % — SIGNIFICANT CHANGE UP (ref 34.5–45)
HGB BLD-MCNC: 11.2 G/DL — LOW (ref 11.5–15.5)
IMM GRANULOCYTES NFR BLD AUTO: 0.4 % — SIGNIFICANT CHANGE UP (ref 0–0.9)
INR BLD: 1.04 RATIO — SIGNIFICANT CHANGE UP (ref 0.88–1.16)
LYMPHOCYTES # BLD AUTO: 1.12 K/UL — SIGNIFICANT CHANGE UP (ref 1–3.3)
LYMPHOCYTES # BLD AUTO: 15.5 % — SIGNIFICANT CHANGE UP (ref 13–44)
MCHC RBC-ENTMCNC: 28.1 PG — SIGNIFICANT CHANGE UP (ref 27–34)
MCHC RBC-ENTMCNC: 31.8 GM/DL — LOW (ref 32–36)
MCV RBC AUTO: 88.2 FL — SIGNIFICANT CHANGE UP (ref 80–100)
MONOCYTES # BLD AUTO: 0.73 K/UL — SIGNIFICANT CHANGE UP (ref 0–0.9)
MONOCYTES NFR BLD AUTO: 10.1 % — SIGNIFICANT CHANGE UP (ref 2–14)
NEUTROPHILS # BLD AUTO: 5.25 K/UL — SIGNIFICANT CHANGE UP (ref 1.8–7.4)
NEUTROPHILS NFR BLD AUTO: 72.6 % — SIGNIFICANT CHANGE UP (ref 43–77)
NRBC # BLD: 0 /100 WBCS — SIGNIFICANT CHANGE UP (ref 0–0)
PLATELET # BLD AUTO: 246 K/UL — SIGNIFICANT CHANGE UP (ref 150–400)
POTASSIUM SERPL-MCNC: 3.7 MMOL/L — SIGNIFICANT CHANGE UP (ref 3.5–5.3)
POTASSIUM SERPL-SCNC: 3.7 MMOL/L — SIGNIFICANT CHANGE UP (ref 3.5–5.3)
PROT SERPL-MCNC: 7.5 G/DL — SIGNIFICANT CHANGE UP (ref 6–8.3)
PROTHROM AB SERPL-ACNC: 12.1 SEC — SIGNIFICANT CHANGE UP (ref 10.5–13.4)
RBC # BLD: 3.99 M/UL — SIGNIFICANT CHANGE UP (ref 3.8–5.2)
RBC # FLD: 13.9 % — SIGNIFICANT CHANGE UP (ref 10.3–14.5)
RH IG SCN BLD-IMP: POSITIVE — SIGNIFICANT CHANGE UP
SODIUM SERPL-SCNC: 139 MMOL/L — SIGNIFICANT CHANGE UP (ref 135–145)
WBC # BLD: 7.23 K/UL — SIGNIFICANT CHANGE UP (ref 3.8–10.5)
WBC # FLD AUTO: 7.23 K/UL — SIGNIFICANT CHANGE UP (ref 3.8–10.5)

## 2023-06-28 PROCEDURE — 99285 EMERGENCY DEPT VISIT HI MDM: CPT

## 2023-06-28 PROCEDURE — 99053 MED SERV 10PM-8AM 24 HR FAC: CPT

## 2023-06-28 PROCEDURE — 72192 CT PELVIS W/O DYE: CPT | Mod: 26,MG

## 2023-06-28 PROCEDURE — 71045 X-RAY EXAM CHEST 1 VIEW: CPT | Mod: 26

## 2023-06-28 PROCEDURE — G1004: CPT

## 2023-06-28 PROCEDURE — 73503 X-RAY EXAM HIP UNI 4/> VIEWS: CPT | Mod: 26,LT

## 2023-06-28 PROCEDURE — 76377 3D RENDER W/INTRP POSTPROCES: CPT | Mod: 26

## 2023-06-28 PROCEDURE — 73552 X-RAY EXAM OF FEMUR 2/>: CPT | Mod: 26,LT

## 2023-06-28 PROCEDURE — 99221 1ST HOSP IP/OBS SF/LOW 40: CPT

## 2023-06-28 RX ORDER — ACETAMINOPHEN 500 MG
1000 TABLET ORAL ONCE
Refills: 0 | Status: COMPLETED | OUTPATIENT
Start: 2023-06-28 | End: 2023-06-28

## 2023-06-28 RX ORDER — ROSUVASTATIN CALCIUM 5 MG/1
1 TABLET ORAL
Refills: 0 | DISCHARGE

## 2023-06-28 RX ORDER — ASPIRIN/CALCIUM CARB/MAGNESIUM 324 MG
81 TABLET ORAL DAILY
Refills: 0 | Status: DISCONTINUED | OUTPATIENT
Start: 2023-06-28 | End: 2023-06-29

## 2023-06-28 RX ORDER — GLUCAGON INJECTION, SOLUTION 0.5 MG/.1ML
1 INJECTION, SOLUTION SUBCUTANEOUS ONCE
Refills: 0 | Status: DISCONTINUED | OUTPATIENT
Start: 2023-06-28 | End: 2023-06-29

## 2023-06-28 RX ORDER — ASCORBIC ACID 60 MG
0 TABLET,CHEWABLE ORAL
Refills: 0 | DISCHARGE

## 2023-06-28 RX ORDER — MULTIVIT-MIN/FERROUS GLUCONATE 9 MG/15 ML
1 LIQUID (ML) ORAL
Refills: 0 | DISCHARGE

## 2023-06-28 RX ORDER — SODIUM CHLORIDE 9 MG/ML
1000 INJECTION, SOLUTION INTRAVENOUS
Refills: 0 | Status: DISCONTINUED | OUTPATIENT
Start: 2023-06-28 | End: 2023-06-29

## 2023-06-28 RX ORDER — ERGOCALCIFEROL 1.25 MG/1
0 CAPSULE ORAL
Refills: 0 | DISCHARGE

## 2023-06-28 RX ORDER — ENOXAPARIN SODIUM 100 MG/ML
40 INJECTION SUBCUTANEOUS EVERY 24 HOURS
Refills: 0 | Status: DISCONTINUED | OUTPATIENT
Start: 2023-06-28 | End: 2023-06-29

## 2023-06-28 RX ORDER — DEXTROSE 50 % IN WATER 50 %
25 SYRINGE (ML) INTRAVENOUS ONCE
Refills: 0 | Status: DISCONTINUED | OUTPATIENT
Start: 2023-06-28 | End: 2023-06-29

## 2023-06-28 RX ORDER — DEXTROSE 50 % IN WATER 50 %
12.5 SYRINGE (ML) INTRAVENOUS ONCE
Refills: 0 | Status: DISCONTINUED | OUTPATIENT
Start: 2023-06-28 | End: 2023-06-29

## 2023-06-28 RX ORDER — CALCIUM CARBONATE 500(1250)
0 TABLET ORAL
Refills: 0 | DISCHARGE

## 2023-06-28 RX ORDER — METFORMIN HYDROCHLORIDE 850 MG/1
1 TABLET ORAL
Refills: 0 | DISCHARGE

## 2023-06-28 RX ORDER — ATORVASTATIN CALCIUM 80 MG/1
20 TABLET, FILM COATED ORAL AT BEDTIME
Refills: 0 | Status: DISCONTINUED | OUTPATIENT
Start: 2023-06-28 | End: 2023-06-29

## 2023-06-28 RX ORDER — TRAMADOL HYDROCHLORIDE 50 MG/1
25 TABLET ORAL EVERY 8 HOURS
Refills: 0 | Status: DISCONTINUED | OUTPATIENT
Start: 2023-06-28 | End: 2023-06-29

## 2023-06-28 RX ORDER — ASPIRIN/CALCIUM CARB/MAGNESIUM 324 MG
1 TABLET ORAL
Refills: 0 | DISCHARGE

## 2023-06-28 RX ORDER — ACETAMINOPHEN 500 MG
650 TABLET ORAL EVERY 6 HOURS
Refills: 0 | Status: DISCONTINUED | OUTPATIENT
Start: 2023-06-28 | End: 2023-06-29

## 2023-06-28 RX ORDER — DEXTROSE 50 % IN WATER 50 %
15 SYRINGE (ML) INTRAVENOUS ONCE
Refills: 0 | Status: DISCONTINUED | OUTPATIENT
Start: 2023-06-28 | End: 2023-06-29

## 2023-06-28 RX ADMIN — ATORVASTATIN CALCIUM 20 MILLIGRAM(S): 80 TABLET, FILM COATED ORAL at 21:31

## 2023-06-28 RX ADMIN — Medication 1 TABLET(S): at 13:20

## 2023-06-28 RX ADMIN — Medication 400 MILLIGRAM(S): at 04:05

## 2023-06-28 RX ADMIN — ENOXAPARIN SODIUM 40 MILLIGRAM(S): 100 INJECTION SUBCUTANEOUS at 13:20

## 2023-06-28 RX ADMIN — Medication 81 MILLIGRAM(S): at 13:19

## 2023-06-28 NOTE — ED PROVIDER NOTE - CLINICAL SUMMARY MEDICAL DECISION MAKING FREE TEXT BOX
Rabia: 78 year old female with pmhx of dm here with left buttock/hip pain after trip and fall 6 days ago landing on left side on grass when pulled/tripped on dog.  did not hit head, no loc, no n/v. tried to ambulate on it with a cane, but can't move well. tried to exercise leg. c/o pain to left buttock area. PE: alert, nad, nonlabored respirations, + s1s1, abdomen soft nt, nd, pelvis stable, + ttp left buttock, + 2 dp b/l le, skin intact, moving le, sensation intact b/l le.     will get labs, pain control, xray hip, pelvis, femur r/o fracture, reassess.

## 2023-06-28 NOTE — H&P ADULT - NS ATTEND AMEND GEN_ALL_CORE FT
77yo F PMHx of DM2, HLD and Osteoporosis, recent fall last thursday found to have left pubic rami fx and sacral fx, presents with inability to ambulate.    # Left Pubic Rami fx & Sacral fx s/p Mechanical Fall  Ortho consulted -> no acute surgical intervention at this time  WBAT BLLE w/ assistance as needed  PT eval - patient cannot care for self at home, will need CARMEN  Case mgmt consult  tylenol for pain prn    # DM2:  Hold home PO meds  HgbA1c fu  Continue to monitor blood glucose levels  Sliding Scale    # HLD:  Home med Crestor 5MG -> C/w Atorvastatin 20MG    # DVT ppx:  Lovenox    dw pt and dtr over the phone    [facility]  [phone]

## 2023-06-28 NOTE — H&P ADULT - HISTORY OF PRESENT ILLNESS
Patient is a 78 year old female with PMHx of DM2, HLD and Osteoporosis. Presents to [facility] s/p fall last Thursday. Patient states she was picking up dog poop when suddenly her [employer] wanted to play and knocked her over. Patient states she fell onto her left side and starting experiencing intermittent moderate to severe left hip and buttocks pain. Patient states the pain was tolerable and she was able to ambulate slowly with a cane and even manage climbing stairs slowly. Patient tried alleviating her pain with OTC Tylenol Q12H however the pain was not getting better. Denies any numbness or tingling. Patient then decided to come to ED for further evaluation.

## 2023-06-28 NOTE — ED PROVIDER NOTE - SHIFT CHANGE DETAILS
Attending MD Yoo: 78F s/p [facility] trip fall, 6d ago, +L pubic rami, pending Ortho, TBA unable to amb (Hosp)

## 2023-06-28 NOTE — ED PROVIDER NOTE - OBJECTIVE STATEMENT
77 y/o female, pmh DM, presents to the ER for pain to left buttock/hip after trip and fall 6 days ago. states she tripped over a dog.  did not hit her head. no LOC.  has been having left sided hip/buttock pain since the fall.  has been ambulating with a cane. denies f/n/v/d, SOB, CP, HA, dizziness, abdominal pain, urinary symptoms.

## 2023-06-28 NOTE — ED PROVIDER NOTE - PHYSICAL EXAMINATION
MSK: ttp to left buttock. no ttp to hips. no ecchymosis present. no swelling, erythema, lacerations or abrasions. able to minimally elevated left leg.   right leg/hip winl. no ttp. able to fully  range right side.

## 2023-06-28 NOTE — ED ADULT NURSE NOTE - OBJECTIVE STATEMENT
79 y/o Female presents to ED from home with c/o hip pain. PMH of DM, HLD, osteoporosis. Pt states on Thursday 6/22 a dog jumped up onto her knocking her over and fell onto left side. Endorses  8/10 left hip pain and and buttox pain. Pt states she has been trying to ambulate with cane which has been increasingly more difficult.  Last took Tylenol at 430pm. Denies SOB, CP, HA, fever, chills, cough, dizziness, N/V/D. Pt is A&Ox3, well appearing/ speaking full sentences without difficulty. Airway patent with spontaneous unlabored breathing, skin is warm and normal color for race. No diaphoresis or edema noted. Safety maintained bed is in the lowest position, locked and call bell in reach.

## 2023-06-28 NOTE — ED ADULT NURSE REASSESSMENT NOTE - NS ED NURSE REASSESS COMMENT FT1
Alert and orientedx4. Denies pain when at rest. Pt wants to go to bathroom. Explained to her that she has pelvic fracture, offered bed pan and prima fit but pt refused. States ".I'd been walking with this for 6 days." I want to got to bathroom". Brought to bathroom on wheelchair. Reports pressure like pain left pelvis 4/10. Safety maintained. Orthopedic at bedside.
Report received from MATHEW Stovall. Pt A &Ox3 sitting up in stretcher, reports pain on LLE. 20G IV placed in left AC and IV acetaminophen administered. Safety and comfort measures in place bed in lowest position, siderails up, and blanket given.

## 2023-06-28 NOTE — ED PROVIDER NOTE - NS ED ATTENDING STATEMENT MOD
This was a shared visit with the ELIEZER. I reviewed and verified the documentation and independently performed the documented:

## 2023-06-28 NOTE — H&P ADULT - NSHPREVIEWOFSYSTEMS_GEN_ALL_CORE
GENERAL: no weakness, no fever/chills, no weight loss/gain  EYES/ENT: No visual changes, no vertigo or throat pain  NECK: No pain or stiffness   RESPIRATORY: no cough, no wheezing, no hemoptysis, no dyspnea, no shortness of breath  CARDIOVASCULAR: no chest pain or palpitations  GASTROINTESTINAL: no n/v/d, no abdominal or epigastric pain  GENITOURINARY: no dysuria, no frequency, no nocturia, no hematuria  MUSCULOSKELETAL: + left hip and buttocks pain   NEUROLOGICAL: no HA, no dizziness, no weakness, no numbness  SKIN: No itching, rashes

## 2023-06-28 NOTE — H&P ADULT - NSHPLABSRESULTS_GEN_ALL_CORE
LABS:                        11.2   7.23  )-----------( 246      ( 28 Jun 2023 04:23 )             35.2     06-28    139  |  103  |  23  ----------------------------<  172<H>  3.7   |  23  |  0.63    Ca    9.1      28 Jun 2023 04:23    TPro  7.5  /  Alb  4.0  /  TBili  0.7  /  DBili  x   /  AST  22  /  ALT  19  /  AlkPhos  120  06-28    PT/INR - ( 28 Jun 2023 04:23 )   PT: 12.1 sec;   INR: 1.04 ratio         PTT - ( 28 Jun 2023 04:23 )  PTT:25.9 sec  CAPILLARY BLOOD GLUCOSE            Urinalysis Basic - ( 28 Jun 2023 04:23 )    Color: x / Appearance: x / SG: x / pH: x  Gluc: 172 mg/dL / Ketone: x  / Bili: x / Urobili: x   Blood: x / Protein: x / Nitrite: x   Leuk Esterase: x / RBC: x / WBC x   Sq Epi: x / Non Sq Epi: x / Bacteria: x        RADIOLOGY & ADDITIONAL TESTS:    < from: Xray Chest 1 View AP/PA (06.28.23 @ 04:34) >    Clear lungs.        ******PRELIMINARY REPORT******      < end of copied text >    < from: Xray Pelvis 3 Views (06.28.23 @ 04:34) >    Acute comminuted displaced fracture of the left superior pubic ramus,   left pubic body, and left inferior pubic ramus. Fracture lines extend   into the pubic symphysis.    Mild bilateral hip arthrosis. Left hip alignment is anatomic.    Distal left femur is off the field-of-view on the lateral view, limiting   evaluation in this region. No acute displaced fracture seen within the   visualized left femur.    < end of copied text >    < from: CT 3D Reconstruct w/ Workstation (06.28.23 @ 08:49) >    Acute comminuted displaced fractures of left superior and inferior pubic   rami at the pubic symphyseal junction.    Acute comminuted fracture of the left hemisacrum extending to the L5-S1   disc space and the left sacroiliac joint.    < end of copied text >        Imaging Personally Reviewed:  [x] YES  [ ] NO    Consultant(s) Notes Reviewed:  [x] YES  [ ] NO    Care Discussed with Consultants/Other Providers [x] YES  [ ] NO

## 2023-06-28 NOTE — H&P ADULT - ASSESSMENT
Patient is a 78 year old female with PMHx of DM2, HLD and Osteoporosis. Presents to [facility] s/p fall last Thursday.     # Left Pubic Rami fx & Sacral fx s/p Mechanical Fall:  XR with L santamaria/inf pubic rami fx  CT with L sacral fx  Ortho consulted -> no acute surgical intervention at this time  WBAT BLLE w/ assistance as needed  PT eval  Case mgmt consult  Pain mgmt    # DM2:  Hold home PO meds  HgbA1c  Continue to monitor blood glucose levels  Sliding Scale    # HLD:  Home med Crestor 5MG -> C/w Atorvastatin 20MG    # DVT ppx:  Lovenox    [facility]  [phone]

## 2023-06-28 NOTE — ED ADULT NURSE NOTE - NSFALLUNIVINTERV_ED_ALL_ED
Bed/Stretcher in lowest position, wheels locked, appropriate side rails in place/Call bell, personal items and telephone in reach/Instruct patient to call for assistance before getting out of bed/chair/stretcher/Non-slip footwear applied when patient is off stretcher/[place] to call system/Physically safe environment - no spills, clutter or unnecessary equipment/Purposeful proactive rounding/Room/bathroom lighting operational, light cord in reach

## 2023-06-28 NOTE — ED ADULT NURSE REASSESSMENT NOTE - NSFALLRISKINTERV_ED_ALL_ED

## 2023-06-28 NOTE — ED PROVIDER NOTE - PROGRESS NOTE DETAILS
Marty Bowser PA-C: imaging reviewed. fracture noted. ortho paged. pending return call at this time. Attending MD Yoo: Ortho in Emergency Department, recommend CT Pelvis, will await.  Likely PT pending CT results and Ortho recs if able to WBAT. Reid, PGY4 - received pt as sign-out, pt with L pubic rami fx s/p mechanical fall 1 week ago, was pending ortho eval. Pt seen by ortho who ordered CT pelvis which showed additional sacral fx; ortho states pt still WBAT and can f/u outpt in 7-10 days. Pt reevaluated, pain well-controlled in bed. States her daughter would like her to go to rehab, pt unable to get down stairs in her home by herself. [facility] hospitalist christine (PCP is Dr. Derrek Smith). Attending MD Yoo: Fractures noted on CT, discussed with Ortho, WBAT.  Patient unable to manage getting around home independently (stairs), concern for safety.  Will admit.  Awaiting call back from [facility]. Reid, PGY4 - endorsed to hospitalist Dr. Leach

## 2023-06-28 NOTE — PATIENT PROFILE ADULT - FALL HARM RISK - HARM RISK INTERVENTIONS
Assistance with ambulation/Assistance OOB with selected safe patient handling equipment/Communicate Risk of Fall with Harm to all staff/Discuss with provider need for PT consult/Monitor gait and stability/Reinforce activity limits and safety measures with patient and family/Tailored Fall Risk Interventions/Visual Cue: Yellow wristband and red socks/Bed in lowest position, wheels locked, appropriate side rails in place/Call bell, personal items and telephone in reach/Instruct patient to call for assistance before getting out of bed or chair/Non-slip footwear when patient is out of bed/[place] to call system/Physically safe environment - no spills, clutter or unnecessary equipment/Purposeful Proactive Rounding/Room/bathroom lighting operational, light cord in reach

## 2023-06-28 NOTE — CONSULT NOTE ADULT - SUBJECTIVE AND OBJECTIVE BOX
78y Female presents c/o groin pain sp mechanical fall while playing with dogs one week ago. Denies HS/LOC. Denies numbness/tingling. Denies pain/injury elsewhere. Has been ambulatory w pain since the fall    HEALTH ISSUES - PROBLEM Dx:        MEDICATIONS  (STANDING):    Allergies    No Known Allergies    Intolerances                              11.2   7.23  )-----------( 246      ( 28 Jun 2023 04:23 )             35.2     28 Jun 2023 04:23    139    |  103    |  23     ----------------------------<  172    3.7     |  23     |  0.63     Ca    9.1        28 Jun 2023 04:23    TPro  7.5    /  Alb  4.0    /  TBili  0.7    /  DBili  x      /  AST  22     /  ALT  19     /  AlkPhos  120    28 Jun 2023 04:23    PT/INR - ( 28 Jun 2023 04:23 )   PT: 12.1 sec;   INR: 1.04 ratio         PTT - ( 28 Jun 2023 04:23 )  PTT:25.9 sec      Vital Signs Last 24 Hrs  T(C): 36.7 (06-28-23 @ 07:20), Max: 36.8 (06-28-23 @ 02:09)  T(F): 98.1 (06-28-23 @ 07:20), Max: 98.3 (06-28-23 @ 02:09)  HR: 81 (06-28-23 @ 07:20) (72 - 101)  BP: 164/90 (06-28-23 @ 07:20) (123/68 - 164/90)  BP(mean): 101 (06-28-23 @ 06:40) (101 - 101)  RR: 18 (06-28-23 @ 07:20) (16 - 18)  SpO2: 100% (06-28-23 @ 07:20) (98% - 100%)  Gen: NAD  BLLE:   Skin intact,  +TTP groin   PROM with mild pain  Negative log roll/heel strike  +TA/EHL/FS  L2-S1 SILT  DP/PT 2+  Compartments soft and compressible    RUE: No bony tenderness or deformity, skin intact  LUE: No bony tenderness or deformity, skin intact    XR shows L santamaria/inf pubic rami fx    A/P: 78y Female w L Sup/Inf Pubic Ramus Fracture    FU CT pelv ordered   Pain control  DVT ppx  WBAT BLLE  No acute ortho surgical intervention  once CT complete may:  Follow up with Dr. Koo as outpatient in 7-10 days, call office for appointment  Ortho stable 78y Female presents c/o groin pain sp mechanical fall while playing with dogs one week ago. Denies HS/LOC. Denies numbness/tingling. Denies pain/injury elsewhere. Has been ambulatory w pain since the fall    HEALTH ISSUES - PROBLEM Dx:        MEDICATIONS  (STANDING):    Allergies    No Known Allergies    Intolerances                              11.2   7.23  )-----------( 246      ( 28 Jun 2023 04:23 )             35.2     28 Jun 2023 04:23    139    |  103    |  23     ----------------------------<  172    3.7     |  23     |  0.63     Ca    9.1        28 Jun 2023 04:23    TPro  7.5    /  Alb  4.0    /  TBili  0.7    /  DBili  x      /  AST  22     /  ALT  19     /  AlkPhos  120    28 Jun 2023 04:23    PT/INR - ( 28 Jun 2023 04:23 )   PT: 12.1 sec;   INR: 1.04 ratio         PTT - ( 28 Jun 2023 04:23 )  PTT:25.9 sec      Vital Signs Last 24 Hrs  T(C): 36.7 (06-28-23 @ 07:20), Max: 36.8 (06-28-23 @ 02:09)  T(F): 98.1 (06-28-23 @ 07:20), Max: 98.3 (06-28-23 @ 02:09)  HR: 81 (06-28-23 @ 07:20) (72 - 101)  BP: 164/90 (06-28-23 @ 07:20) (123/68 - 164/90)  BP(mean): 101 (06-28-23 @ 06:40) (101 - 101)  RR: 18 (06-28-23 @ 07:20) (16 - 18)  SpO2: 100% (06-28-23 @ 07:20) (98% - 100%)  Gen: NAD  BLLE:   Skin intact,  +TTP groin   PROM with mild pain  Negative log roll/heel strike  +TA/EHL/FS  L2-S1 SILT  DP/PT 2+  Compartments soft and compressible    RUE: No bony tenderness or deformity, skin intact  LUE: No bony tenderness or deformity, skin intact    XR shows L santamaria/inf pubic rami fx  CT shows additional L sacral fx    A/P: 78y Female w L LC1 Fracture      Pain control  DVT ppx  WBAT BLLE w assistance as needed   No acute ortho surgical intervention  once CT complete may:  Follow up with Dr. Koo as outpatient in 7-10 days, call office for appointment  Ortho stable

## 2023-06-28 NOTE — ED ADULT TRIAGE NOTE - CHIEF COMPLAINT QUOTE
pushed down by dog thursday, endorses worsening left hip pain since. Denies head strike or a/c use. pushed down by dog Thursday, endorses worsening left hip pain since. Denies head strike or a/c use.

## 2023-06-28 NOTE — H&P ADULT - NSHPPHYSICALEXAM_GEN_ALL_CORE
Vital Signs Last 24 Hrs  T(C): 36.7 (28 Jun 2023 07:20), Max: 36.8 (28 Jun 2023 02:09)  T(F): 98.1 (28 Jun 2023 07:20), Max: 98.3 (28 Jun 2023 02:09)  HR: 81 (28 Jun 2023 07:20) (72 - 101)  BP: 164/90 (28 Jun 2023 07:20) (123/68 - 164/90)  BP(mean): 101 (28 Jun 2023 06:40) (101 - 101)  RR: 18 (28 Jun 2023 07:20) (16 - 18)  SpO2: 100% (28 Jun 2023 07:20) (98% - 100%)    Parameters below as of 28 Jun 2023 07:20  Patient On (Oxygen Delivery Method): room air Vital Signs Last 24 Hrs  T(C): 36.7 (28 Jun 2023 07:20), Max: 36.8 (28 Jun 2023 02:09)  T(F): 98.1 (28 Jun 2023 07:20), Max: 98.3 (28 Jun 2023 02:09)  HR: 81 (28 Jun 2023 07:20) (72 - 101)  BP: 164/90 (28 Jun 2023 07:20) (123/68 - 164/90)  BP(mean): 101 (28 Jun 2023 06:40) (101 - 101)  RR: 18 (28 Jun 2023 07:20) (16 - 18)  SpO2: 100% (28 Jun 2023 07:20) (98% - 100%)    Parameters below as of 28 Jun 2023 07:20  Patient On (Oxygen Delivery Method): room air    PHYSICAL EXAM:  GENERAL: NAD, well-developed, comfortable  HEAD:  Atraumatic, Normocephalic  EYES: EOMI, PERRLA, conjunctiva and sclera clear  NECK: Supple, No JVD  CHEST/LUNG: Clear to auscultation bilaterally; No wheeze  HEART: Regular rate and rhythm; No murmurs, rubs, or gallops  ABDOMEN: Soft, Nontender, Nondistended; Bowel sounds present  NEURO: AAOx3, no focal weakness, 5/5 b/l extremity strength, b/l knee no arthritis, no effusion   EXTREMITIES:  2+ Peripheral Pulses, No clubbing, cyanosis, or edema  SKIN: No rashes or lesions  MSK: TTP to left buttocks

## 2023-06-29 ENCOUNTER — TRANSCRIPTION ENCOUNTER (OUTPATIENT)
Age: 78
End: 2023-06-29

## 2023-06-29 VITALS
HEART RATE: 71 BPM | RESPIRATION RATE: 18 BRPM | TEMPERATURE: 97 F | OXYGEN SATURATION: 98 % | DIASTOLIC BLOOD PRESSURE: 67 MMHG | SYSTOLIC BLOOD PRESSURE: 108 MMHG

## 2023-06-29 LAB
A1C WITH ESTIMATED AVERAGE GLUCOSE RESULT: 7.3 % — HIGH (ref 4–5.6)
ALBUMIN SERPL ELPH-MCNC: 3.6 G/DL — SIGNIFICANT CHANGE UP (ref 3.3–5)
ALP SERPL-CCNC: 110 U/L — SIGNIFICANT CHANGE UP (ref 40–120)
ALT FLD-CCNC: 15 U/L — SIGNIFICANT CHANGE UP (ref 10–45)
ANION GAP SERPL CALC-SCNC: 13 MMOL/L — SIGNIFICANT CHANGE UP (ref 5–17)
AST SERPL-CCNC: 16 U/L — SIGNIFICANT CHANGE UP (ref 10–40)
BILIRUB SERPL-MCNC: 0.6 MG/DL — SIGNIFICANT CHANGE UP (ref 0.2–1.2)
BUN SERPL-MCNC: 17 MG/DL — SIGNIFICANT CHANGE UP (ref 7–23)
CALCIUM SERPL-MCNC: 8.7 MG/DL — SIGNIFICANT CHANGE UP (ref 8.4–10.5)
CHLORIDE SERPL-SCNC: 105 MMOL/L — SIGNIFICANT CHANGE UP (ref 96–108)
CO2 SERPL-SCNC: 22 MMOL/L — SIGNIFICANT CHANGE UP (ref 22–31)
CREAT SERPL-MCNC: 0.59 MG/DL — SIGNIFICANT CHANGE UP (ref 0.5–1.3)
EGFR: 91 ML/MIN/1.73M2 — SIGNIFICANT CHANGE UP
ESTIMATED AVERAGE GLUCOSE: 163 MG/DL — HIGH (ref 68–114)
GLUCOSE SERPL-MCNC: 192 MG/DL — HIGH (ref 70–99)
HCT VFR BLD CALC: 33.5 % — LOW (ref 34.5–45)
HGB BLD-MCNC: 10.9 G/DL — LOW (ref 11.5–15.5)
MCHC RBC-ENTMCNC: 28.2 PG — SIGNIFICANT CHANGE UP (ref 27–34)
MCHC RBC-ENTMCNC: 32.5 GM/DL — SIGNIFICANT CHANGE UP (ref 32–36)
MCV RBC AUTO: 86.8 FL — SIGNIFICANT CHANGE UP (ref 80–100)
NRBC # BLD: 0 /100 WBCS — SIGNIFICANT CHANGE UP (ref 0–0)
PLATELET # BLD AUTO: 264 K/UL — SIGNIFICANT CHANGE UP (ref 150–400)
POTASSIUM SERPL-MCNC: 3.7 MMOL/L — SIGNIFICANT CHANGE UP (ref 3.5–5.3)
POTASSIUM SERPL-SCNC: 3.7 MMOL/L — SIGNIFICANT CHANGE UP (ref 3.5–5.3)
PROT SERPL-MCNC: 6.6 G/DL — SIGNIFICANT CHANGE UP (ref 6–8.3)
RBC # BLD: 3.86 M/UL — SIGNIFICANT CHANGE UP (ref 3.8–5.2)
RBC # FLD: 13.9 % — SIGNIFICANT CHANGE UP (ref 10.3–14.5)
SODIUM SERPL-SCNC: 140 MMOL/L — SIGNIFICANT CHANGE UP (ref 135–145)
WBC # BLD: 7.29 K/UL — SIGNIFICANT CHANGE UP (ref 3.8–10.5)
WBC # FLD AUTO: 7.29 K/UL — SIGNIFICANT CHANGE UP (ref 3.8–10.5)

## 2023-06-29 PROCEDURE — 85027 COMPLETE CBC AUTOMATED: CPT

## 2023-06-29 PROCEDURE — 76377 3D RENDER W/INTRP POSTPROCES: CPT

## 2023-06-29 PROCEDURE — 99285 EMERGENCY DEPT VISIT HI MDM: CPT | Mod: 25

## 2023-06-29 PROCEDURE — 73502 X-RAY EXAM HIP UNI 2-3 VIEWS: CPT

## 2023-06-29 PROCEDURE — 72190 X-RAY EXAM OF PELVIS: CPT

## 2023-06-29 PROCEDURE — 85610 PROTHROMBIN TIME: CPT

## 2023-06-29 PROCEDURE — 82962 GLUCOSE BLOOD TEST: CPT

## 2023-06-29 PROCEDURE — 73552 X-RAY EXAM OF FEMUR 2/>: CPT

## 2023-06-29 PROCEDURE — 86900 BLOOD TYPING SEROLOGIC ABO: CPT

## 2023-06-29 PROCEDURE — 71045 X-RAY EXAM CHEST 1 VIEW: CPT

## 2023-06-29 PROCEDURE — 97162 PT EVAL MOD COMPLEX 30 MIN: CPT

## 2023-06-29 PROCEDURE — 85025 COMPLETE CBC W/AUTO DIFF WBC: CPT

## 2023-06-29 PROCEDURE — G1004: CPT

## 2023-06-29 PROCEDURE — 85730 THROMBOPLASTIN TIME PARTIAL: CPT

## 2023-06-29 PROCEDURE — 86901 BLOOD TYPING SEROLOGIC RH(D): CPT

## 2023-06-29 PROCEDURE — 83036 HEMOGLOBIN GLYCOSYLATED A1C: CPT

## 2023-06-29 PROCEDURE — 96374 THER/PROPH/DIAG INJ IV PUSH: CPT

## 2023-06-29 PROCEDURE — 80053 COMPREHEN METABOLIC PANEL: CPT

## 2023-06-29 PROCEDURE — 72192 CT PELVIS W/O DYE: CPT | Mod: MG

## 2023-06-29 PROCEDURE — 86850 RBC ANTIBODY SCREEN: CPT

## 2023-06-29 RX ORDER — ACETAMINOPHEN 500 MG
2 TABLET ORAL
Qty: 0 | Refills: 0 | DISCHARGE
Start: 2023-06-29

## 2023-06-29 RX ORDER — INSULIN LISPRO 100/ML
VIAL (ML) SUBCUTANEOUS AT BEDTIME
Refills: 0 | Status: DISCONTINUED | OUTPATIENT
Start: 2023-06-29 | End: 2023-06-29

## 2023-06-29 RX ORDER — INSULIN LISPRO 100/ML
VIAL (ML) SUBCUTANEOUS
Refills: 0 | Status: DISCONTINUED | OUTPATIENT
Start: 2023-06-29 | End: 2023-06-29

## 2023-06-29 RX ADMIN — ENOXAPARIN SODIUM 40 MILLIGRAM(S): 100 INJECTION SUBCUTANEOUS at 13:41

## 2023-06-29 RX ADMIN — Medication 81 MILLIGRAM(S): at 11:58

## 2023-06-29 RX ADMIN — Medication 650 MILLIGRAM(S): at 08:59

## 2023-06-29 RX ADMIN — Medication 650 MILLIGRAM(S): at 08:15

## 2023-06-29 RX ADMIN — Medication 1 TABLET(S): at 11:58

## 2023-06-29 NOTE — DISCHARGE NOTE NURSING/CASE MANAGEMENT/SOCIAL WORK - PATIENT PORTAL LINK FT
You can access the FollowMyHealth Patient Portal offered by [facility] by registering at the following website: [email or web address]. By joining [facility]’s FollowMyHealth portal, you will also be able to view your health information using other applications (apps) compatible with our system.

## 2023-06-29 NOTE — PHYSICAL THERAPY INITIAL EVALUATION ADULT - ADDITIONAL COMMENTS
Pt resides in private home with family, 4 steps to enter, flight within, PTA independent with mobility and ADL's, owns no DME, (+)driving, retired.

## 2023-06-29 NOTE — DISCHARGE NOTE PROVIDER - CARE PROVIDER_API CALL
Juan Koo  Orthopaedic Surgery  [address], [address]  [place] [address]  Phone: [phone]  Fax: [phone]  Follow Up Time: 1 week    PCP,   Phone: (   )    -  Fax: (   )    -  Established Patient  Follow Up Time: 1 week

## 2023-06-29 NOTE — DISCHARGE NOTE PROVIDER - NSDCCPCAREPLAN_GEN_ALL_CORE_FT
PRINCIPAL DISCHARGE DIAGNOSIS  Diagnosis: Closed fracture of multiple pubic rami, initial encounter  Assessment and Plan of Treatment: CT Pelvis shows Acute comminuted displaced fractures of left superior and inferior pubic rami at the pubic symphyseal junction. Acute comminuted fracture of the left hemisacrum extending to the L5-S1 disc space and the left sacroiliac joint.  Orthopedics consult. No surgical intervention at this time.  Weight bearing as tolerated to bilateral lower extremities; assistance as needed. Tylenol for pain as needed.   Please use rolling walker and shower chair. Participate in home physical therapy.  Please follow-up with your primary care physician within one week of discharge.  Please follow-up with ortho Dr. Koo in 7-10 days for folow-up.

## 2023-06-29 NOTE — PHYSICAL THERAPY INITIAL EVALUATION ADULT - GAIT DEVIATIONS NOTED, PT EVAL
decreased norman/increased time in double stance/decreased velocity of limb motion/decreased step length/decreased stride length/decreased weight-shifting ability

## 2023-06-29 NOTE — PROGRESS NOTE ADULT - ASSESSMENT
79yo F PMHx of DM2, HLD and Osteoporosis, recent fall last thursday found to have left pubic rami fx and sacral fx, presents with inability to ambulate.    # Left Pubic Rami fx & Sacral fx s/p Mechanical Fall  Ortho consulted -> no acute surgical intervention at this time  WBAT BLLE w/ assistance as needed  PT eval  tylenol for pain prn    # DM2:  Hold home PO meds  Continue to monitor blood glucose levels  Sliding Scale    # HLD:  Home med Crestor 5MG -> C/w Atorvastatin 20MG    # DVT ppx:  Lovenox    dc planning    [facility]  [phone]

## 2023-06-29 NOTE — DISCHARGE NOTE PROVIDER - NSDCMRMEDTOKEN_GEN_ALL_CORE_FT
acetaminophen 325 mg oral tablet: 2 tab(s) orally every 6 hours As needed Mild Pain (1 - 3)  aspirin 81 mg oral tablet: 1 tab(s) orally once a day  calcium: 1 tab once a day  Crestor 5 mg oral tablet: 1 tab(s) orally once a week on mondays  Daily Danilo oral tablet: 1 tab(s) orally once a day  Hair, Skin, Nails 5 mg oral capsule: 1 cap(s) orally once a day  metFORMIN 500 mg oral tablet: 1 tab(s) orally 2 times a day  PreserVision AREDS oral capsule: 1 cap(s) orally 2 times a day  Rolling Walker: daily  Shower Chair: daily  vitamin C: 1 tab once a day  vitamin D: 1 tab once a day   acetaminophen 325 mg oral tablet: 2 tab(s) orally every 6 hours As needed Mild Pain (1 - 3)  aspirin 81 mg oral tablet: 1 tab(s) orally once a day  calcium: 1 tab once a day  Crestor 5 mg oral tablet: 1 tab(s) orally once a week on mondays  Daily Daniol oral tablet: 1 tab(s) orally once a day  Hair, Skin, Nails 5 mg oral capsule: 1 cap(s) orally once a day  metFORMIN 500 mg oral tablet: 1 tab(s) orally 2 times a day  PreserVision AREDS oral capsule: 1 cap(s) orally 2 times a day  Rehab at home  - PT: As directed  Rolling Walker: daily  Shower Chair: daily  vitamin C: 1 tab once a day  vitamin D: 1 tab once a day

## 2023-06-29 NOTE — PROGRESS NOTE ADULT - SUBJECTIVE AND OBJECTIVE BOX
Patient is a 78y old  Female who presents with a chief complaint of fall (28 Jun 2023 10:14)      SUBJECTIVE / OVERNIGHT EVENTS:    Patient seen and examined. sp PT eval. recommended for home PT. no pain.      Vital Signs Last 24 Hrs  T(C): 36.3 (29 Jun 2023 12:11), Max: 36.9 (28 Jun 2023 14:24)  T(F): 97.4 (29 Jun 2023 12:11), Max: 98.4 (28 Jun 2023 14:24)  HR: 71 (29 Jun 2023 12:11) (71 - 97)  BP: 108/67 (29 Jun 2023 12:11) (108/67 - 168/84)  BP(mean): --  RR: 18 (29 Jun 2023 12:11) (18 - 18)  SpO2: 98% (29 Jun 2023 12:11) (96% - 99%)    Parameters below as of 29 Jun 2023 12:11  Patient On (Oxygen Delivery Method): room air      I&O's Summary      PE:  GENERAL: NAD, AAOx3  CHEST/LUNG: CTABL, No wheeze  HEART: Regular rate and rhythm; no murmur  ABDOMEN: Soft, Nontender, Nondistended; Bowel sounds present  EXTREMITIES:  2+ Peripheral Pulses, No [id number] F PMHx of DM2, HLD and Osteoporosis, recent fall last thursday found to have left pubic rami fx and sacral fx, presents with inability to ambulate.    # Left Pubic Rami fx & Sacral fx s/p Mechanical Fall  Ortho consulted -> no acute surgical intervention at this time  WBAT BLLE w/ assistance as needed  PT eval - patient cannot care for self at home, will need CARMEN  Case mgmt consult  tylenol for pain prn    # DM2:  Hold home PO meds  HgbA1c fu  Continue to monitor blood glucose levels  Sliding Scale    # HLD:  Home med Crestor 5MG -> C/w Atorvastatin 20MG    # DVT ppx:  Lovenox    dw pt and dtr over the phone    [facility]  [phone].  NEURO: No focal deficits    LABS:                        10.9   7.29  )-----------( 264      ( 29 Jun 2023 07:09 )             33.5     06-29    140  |  105  |  17  ----------------------------<  192<H>  3.7   |  22  |  0.59    Ca    8.7      29 Jun 2023 07:12    TPro  6.6  /  Alb  3.6  /  TBili  0.6  /  DBili  x   /  AST  16  /  ALT  15  /  AlkPhos  110  06-29    PT/INR - ( 28 Jun 2023 04:23 )   PT: 12.1 sec;   INR: 1.04 ratio         PTT - ( 28 Jun 2023 04:23 )  PTT:25.9 sec  CAPILLARY BLOOD GLUCOSE      POCT Blood Glucose.: 146 mg/dL (29 Jun 2023 12:23)  POCT Blood Glucose.: 171 mg/dL (29 Jun 2023 08:39)  POCT Blood Glucose.: 144 mg/dL (28 Jun 2023 21:52)        Urinalysis Basic - ( 29 Jun 2023 07:12 )    Color: x / Appearance: x / SG: x / pH: x  Gluc: 192 mg/dL / Ketone: x  / Bili: x / Urobili: x   Blood: x / Protein: x / Nitrite: x   Leuk Esterase: x / RBC: x / WBC x   Sq Epi: x / Non Sq Epi: x / Bacteria: x        RADIOLOGY & ADDITIONAL TESTS:    Imaging Personally Reviewed:  [x] YES  [ ] NO    Consultant(s) Notes Reviewed:  [x] YES  [ ] NO    MEDICATIONS  (STANDING):  aspirin  chewable 81 milliGRAM(s) Oral daily  atorvastatin 20 milliGRAM(s) Oral at bedtime  dextrose 5%. 1000 milliLiter(s) (50 mL/Hr) IV Continuous <Continuous>  dextrose 5%. 1000 milliLiter(s) (100 mL/Hr) IV Continuous <Continuous>  dextrose 50% Injectable 25 Gram(s) IV Push once  dextrose 50% Injectable 12.5 Gram(s) IV Push once  dextrose 50% Injectable 25 Gram(s) IV Push once  enoxaparin Injectable 40 milliGRAM(s) SubCutaneous every 24 hours  glucagon  Injectable 1 milliGRAM(s) IntraMuscular once  insulin lispro (ADMELOG) corrective regimen sliding scale   SubCutaneous three times a day before meals  insulin lispro (ADMELOG) corrective regimen sliding scale   SubCutaneous at bedtime  multivitamin 1 Tablet(s) Oral daily    MEDICATIONS  (PRN):  acetaminophen     Tablet .. 650 milliGRAM(s) Oral every 6 hours PRN Mild Pain (1 - 3)  dextrose Oral Gel 15 Gram(s) Oral once PRN Blood Glucose LESS THAN 70 milliGRAM(s)/deciliter  traMADol 25 milliGRAM(s) Oral every 8 hours PRN Moderate Pain (4 - 6)      Care Discussed with Consultants/Other Providers [x] YES  [ ] NO    HEALTH ISSUES - PROBLEM Dx:

## 2023-06-29 NOTE — PHYSICAL THERAPY INITIAL EVALUATION ADULT - GENERAL OBSERVATIONS, REHAB EVAL
received semisupine in bed, A&Ox4, following commands, pleasant & eager to participate, admitted s/p fall, pelvis fx, BLE WBAT. BS reviewed.

## 2023-06-29 NOTE — DISCHARGE NOTE NURSING/CASE MANAGEMENT/SOCIAL WORK - NSDCDMENAME_GEN_ALL_CORE_FT
A/P 32y s/p , PPD#1 , stable, meeting postpartum milestones   - Pain: well controlled on tylenol/motrin  - GI: Tolerating regular diet  - : urinating without difficulty/pain  - DVT prophylaxis: ambulating frequently  - Dispo: PPD 2, unless otherwise specified     [facility]

## 2023-06-29 NOTE — CHART NOTE - NSCHARTNOTEFT_GEN_A_CORE
Patient requires a rolling walker for d/c to home due to diagnosis of closed fracture of multiple pubic rami.    Linn Alegria PA-C  Department of Medicine

## 2023-06-29 NOTE — PHYSICAL THERAPY INITIAL EVALUATION ADULT - PERTINENT HX OF CURRENT PROBLEM, REHAB EVAL
Pt is 78F admitted 6/28/23 PMHx of DM2, HLD and Osteoporosis. Presents to [facility] s/p fall last Thursday. Patient states she was picking up dog poop when suddenly her [employer] wanted to play and knocked her over. Patient states she fell onto her left side and starting experiencing intermittent moderate to severe left hip and buttocks pain. Patient states the pain was tolerable and she was able to ambulate slowly with a cane and even manage climbing stairs slowly. Patient tried alleviating her pain with OTC Tylenol Q12H however the pain was not getting better. Denies any numbness or tingling. Patient then decided to come to ED for further evaluation.        CT PELVIS: Acute comminuted displaced fractures of left superior and inferior pubic rami at the pubic symphyseal junction.Acute comminuted fracture of the left hemisacrum extending to the L5-S1 disc space and the left sacroiliac joint. XRAY FEMUR: Acute comminuted displaced fracture of the left superior pubic ramus, left pubic body, and left inferior pubic ramus. Fracture lines extend into the pubic symphysis.Mild bilateral hip arthrosis. Left hip alignment is anatomic.Distal left femur is off the field-of-view on the lateral view, limiting evaluation in this region. No acute displaced fracture seen within the visualized left femur.

## 2023-06-29 NOTE — DISCHARGE NOTE PROVIDER - PROVIDER TOKENS
PROVIDER:[TOKEN:[[id number]],FOLLOWUP:[1 week]],FREE:[LAST:[PCP],PHONE:[(   )    -],FAX:[(   )    -],FOLLOWUP:[1 week],ESTABLISHEDPATIENT:[T]]

## 2023-06-29 NOTE — DISCHARGE NOTE PROVIDER - NSDCFUADDAPPT_GEN_ALL_CORE_FT
APPTS ARE READY TO BE MADE: [x] YES    Best Family or Patient Contact (if needed):    Additional Information about above appointments (if needed):    1:   2:   3:     Other comments or requests:    APPTS ARE READY TO BE MADE: [x] YES    Best Family or Patient Contact (if needed):    Additional Information about above appointments (if needed):    1:   2:   3:     Other comments or requests:   Patient was scheduled for an appointment on 07/06 9:00a at [address] [place] [address] with Dr. Guero Cedeño. Patient/Caregiver was advised of appointment details.

## 2023-06-29 NOTE — DISCHARGE NOTE PROVIDER - HOSPITAL COURSE
79yo F PMHx of DM2, HLD and Osteoporosis, recent fall last thursday found to have left pubic rami fx and sacral fx, presents with inability to ambulate.    # Left Pubic Rami fx & Sacral fx s/p Mechanical Fall  Ortho consulted -> no acute surgical intervention at this time  WBAT BLLE w/ assistance as needed  PT eval -> home PT  tylenol for pain prn  Pain control  DVT ppx  WBAT BLLE w assistance as needed   No acute ortho surgical intervention  once CT complete may:  Follow up with Dr. Koo as outpatient in 7-10 days, call office for appointment  Ortho stable    # DM2:  Hold home PO meds  Continue to monitor blood glucose levels  Sliding Scale    # HLD:  Home med Crestor 5MG -> C/w Atorvastatin 20MG    Discharge planning discussed with attending Dr. Leach. Patient is medically cleared and stable for discharge to home with home PT.   Medication Reconciliation reviewed with attending. Follow up outpatient with your PCP.

## 2023-07-06 ENCOUNTER — APPOINTMENT (OUTPATIENT)
Dept: ORTHOPEDIC SURGERY | Facility: CLINIC | Age: 78
End: 2023-07-06
Payer: MEDICARE

## 2023-07-06 PROCEDURE — 99203 OFFICE O/P NEW LOW 30 MIN: CPT

## 2023-07-06 PROCEDURE — 73502 X-RAY EXAM HIP UNI 2-3 VIEWS: CPT

## 2023-07-06 NOTE — DISCUSSION/SUMMARY
[de-identified] : The underlying pathophysiology was reviewed with the patient. XR films were reviewed with the patient. Discussed at length the nature of the patient’s condition. The pelvis symptoms are secondary to left sided superior and inferior pubic rami fractures. \par \par At this time, I did tell her that the fractures will take 6 weeks to heal. I recommended she begin low impact activities such as walking as tolerated, albeit with the assistance of a walker for support. She deferred a referral to physical therapy as she stated it is not covered by insurance and she would have to pay out of pocket, which she is not interested in doing. I therefore recommended a home exercise program and again advised her to walk, as I told her this is ultimately the best therapy for regaining motion and function. \par \par All questions answered, understanding verbalized. Patient in agreement with plan of care. Follow up in 6 weeks for repeat xrays.

## 2023-07-06 NOTE — HISTORY OF PRESENT ILLNESS
[de-identified] : Pt is an 79 y/o female with multiple pubic rami fractures.  She was bending over in her daughter's yard and the dog jumped on her and knocked her over.  She had pain immediately.  She went to the ED where xrays and CT scans revealed multiple pubic rami fractures.  She continues to have pain.  She ambulates with a walker.  \par \par She is a Type II Diabetic. Her most recent A1c was 6.5.

## 2023-07-06 NOTE — END OF VISIT
[FreeTextEntry3] : All medical record entries made by the Scribe were at my,  Dr. Guero Cedeño MD., direction and personally dictated by me on 07/06/2023. I have personally reviewed the chart and agree that the record accurately reflects my personal performance of the history, physical exam, assessment and plan.

## 2023-07-06 NOTE — ADDENDUM
[FreeTextEntry1] : I, Destiny Gao wrote this note acting as a scribe for Dr. Guero Cedeño on Jul 06, 2023.

## 2023-07-06 NOTE — PHYSICAL EXAM
[de-identified] : Patient is WDWN, alert, and in no acute distress. Breathing is unlabored. She is grossly oriented to person, place, and time.\par \par She presents to the office with the assistance of a wheelchair, although she is able to WBAT with the assistance of a walker. \par Patient remained seated for physical exam, and no formal physical exam was performed, given pain and injury.  [de-identified] : AP, inlet, outlet and Judet views of the pelvis were obtained and revealed fractures of the left superior and inferior pubic rami at the pubic. \par \par ------------------------------------------------------------------------------------------------------------------------------------------------------------------------------------ \par \par EXAM: CT 3D RECONSTRUCT FADUMO GARCIA\par EXAM: CT PELVIS BONY ONLY\par PROCEDURE DATE: 06/28/2023\par IMPRESSION:\par Acute comminuted displaced fractures of left superior and inferior pubic rami at the pubic symphyseal junction.\par \par Acute comminuted fracture of the left hemisacrum extending to the L5-S1 disc space and the left sacroiliac joint.\par \par JEFFREY FERRELL MD; Attending Radiologist\par This document has been electronically signed. Jun 28 2023 8:59AM\par \par ------------------------------------------------------------------------------------------------------------------------------------------------------------------------------------\par \par EXAM: XR HIP 2-3V LT\par EXAM: XR FEMUR 2 VIEWS LT\par EXAM: XR PELVIS COMPLETE MIN 3 VIEWS\par PROCEDURE DATE: 06/28/2023\par IMPRESSION:\par Acute comminuted displaced fracture of the left superior pubic ramus, left pubic body, and left inferior pubic ramus. Fracture lines extend into the pubic symphysis.\par \par Mild bilateral hip arthrosis. Left hip alignment is anatomic.\par \par Distal left femur is off the field-of-view on the lateral view, limiting evaluation in this region. No acute displaced fracture seen within the visualized left femur.\par \par MADELYN BOWENS MD; Resident Radiologist\par This document has been electronically signed.\par GARCÍA VELEZ M.D., ATTENDING RADIOLOGIST\par This document has been electronically signed. Jun 28 2023 8:35AM

## 2023-08-17 ENCOUNTER — APPOINTMENT (OUTPATIENT)
Dept: ORTHOPEDIC SURGERY | Facility: CLINIC | Age: 78
End: 2023-08-17
Payer: MEDICARE

## 2023-08-17 DIAGNOSIS — S32.599A OTHER SPECIFIED FRACTURE OF UNSPECIFIED PUBIS, INITIAL ENCOUNTER FOR CLOSED FRACTURE: ICD-10-CM

## 2023-08-17 PROCEDURE — 73502 X-RAY EXAM HIP UNI 2-3 VIEWS: CPT

## 2023-08-17 PROCEDURE — 99213 OFFICE O/P EST LOW 20 MIN: CPT

## 2023-08-17 NOTE — DISCUSSION/SUMMARY
[de-identified] : The underlying pathophysiology was reviewed with the patient. XR films were reviewed with the patient. Discussed at length the nature of the patient's condition. The pelvis symptoms are secondary to left sided superior and inferior pubic rami fractures.   At this time, I did tell her that her increased pain at the right lower extremity, could be due to compensating due to the injury at the left sided pelvis. I told her that this likely should continue to improve. I recommended low impact activities such as walking, as I told her this is the best therapy for regaining motion and function.   All questions answered, understanding verbalized. Patient in agreement with plan of care. Follow up as needed.

## 2023-08-17 NOTE — END OF VISIT
[FreeTextEntry3] : All medical record entries made by the Scribe were at my,  Dr. Guero Cedeño MD., direction and personally dictated by me on 08/17/2023. I have personally reviewed the chart and agree that the record accurately reflects my personal performance of the history, physical exam, assessment and plan.

## 2023-08-17 NOTE — ADDENDUM
[FreeTextEntry1] : I, Destiny Gao wrote this note acting as a scribe for Dr. Guero Cedeño on Aug 17, 2023.

## 2023-08-17 NOTE — HISTORY OF PRESENT ILLNESS
[de-identified] : Pt is an 77 y/o female with multiple pubic rami fractures.  She was bending over in her daughter's yard and the dog jumped on her and knocked her over.  She had pain immediately.  She went to the ED where xrays and CT scans revealed multiple pubic rami fractures.  She continues to have pain.  She ambulates with a walker.  She was initially seen in the office on 7/6/23 at which time, I recommended low impact activities such as walking. She returns for repeat xrays on 8/17/23 and is doing well. She reports improvements in pain at the left leg but states her right leg, which was not injured is now bothering her.   She is a Type II Diabetic. Her most recent A1c was 6.5.

## 2023-08-17 NOTE — PHYSICAL EXAM
[de-identified] : Patient is WDWN, alert, and in no acute distress. Breathing is unlabored. She is grossly oriented to person, place, and time.  She presents to the office with the assistance of a walker. No focal tenderness. ROM to the hips bilaterally, was not accessed.  [de-identified] : AP, inlet, outlet and Judet views of the pelvis were obtained and revealed fractures of the left superior and inferior pubic rami at the pubic. The fractures are healing well.   ------------------------------------------------------------------------------------------------------------------------------------------------------------------------------------   EXAM: CT 3D RECONSTRUCT FADUMO GARCIA EXAM: CT PELVIS BONY ONLY PROCEDURE DATE: 06/28/2023 IMPRESSION: Acute comminuted displaced fractures of left superior and inferior pubic rami at the pubic symphyseal junction.  Acute comminuted fracture of the left hemisacrum extending to the L5-S1 disc space and the left sacroiliac joint.  JEFFREY FERRELL MD; Attending Radiologist This document has been electronically signed. Jun 28 2023 8:59AM  ------------------------------------------------------------------------------------------------------------------------------------------------------------------------------------  EXAM: XR HIP 2-3V LT EXAM: XR FEMUR 2 VIEWS LT EXAM: XR PELVIS COMPLETE MIN 3 VIEWS PROCEDURE DATE: 06/28/2023 IMPRESSION: Acute comminuted displaced fracture of the left superior pubic ramus, left pubic body, and left inferior pubic ramus. Fracture lines extend into the pubic symphysis.  Mild bilateral hip arthrosis. Left hip alignment is anatomic.  Distal left femur is off the field-of-view on the lateral view, limiting evaluation in this region. No acute displaced fracture seen within the visualized left femur.  MADELYN BOWENS MD; Resident Radiologist This document has been electronically signed. GARCÍA VELEZ M.D., ATTENDING RADIOLOGIST This document has been electronically signed. Jun 28 2023 8:35AM

## 2023-08-24 NOTE — PHYSICAL THERAPY INITIAL EVALUATION ADULT - PRECAUTIONS/LIMITATIONS, REHAB EVAL
fall precautions Nasalis-Muscle-Based Myocutaneous Island Pedicle Flap Text: Using a #15 blade, an incision was made around the donor flap to the level of the nasalis muscle. Wide lateral undermining was then performed in both the subcutaneous plane above the nasalis muscle, and in a submuscular plane just above periosteum. This allowed the formation of a free nasalis muscle axial pedicle (based on the angular artery) which was still attached to the actual cutaneous flap, increasing its mobility and vascular viability. Hemostasis was obtained with pinpoint electrocoagulation. The flap was mobilized into position and the pivotal anchor points positioned and stabilized with buried interrupted sutures. Subcutaneous and dermal tissues were closed in a multilayered fashion with sutures. Tissue redundancies were excised, and the epidermal edges were apposed without significant tension and sutured with sutures.

## 2023-11-30 NOTE — ED PROVIDER NOTE - NS ED MD SHIFT CHANGE PLANNED DISPOSITION
PT REPORTS HAVING STIFF NECK FOR LAST 3-4 WEEKS AND SEEING A CHIRO FOR THIS.  DENIES HA.  AGREE WITH TRIAGE NOTE.  PT STATE ATE BREAKFAST BEFORE SX BEGAN.  SX SEEMS TO BE RESOLVING SPONTANEOUSLY.  MILD LIGHTHEADED FEELING AT THIS TIME.  DENIES NUMBNESS IN R ARM AT THIS TIME.  REPORTS TINGLING IN FINGERS OF R HAND REMAIN.  PT PLAYING SOLITARE ON PHONE WITHOUT DIFF USING R HAND AT THIS TIME.    admit to hospital as inpatient

## 2024-08-12 ENCOUNTER — RX ONLY (RX ONLY)
Age: 79
End: 2024-08-12

## 2024-08-12 ENCOUNTER — OFFICE (OUTPATIENT)
Dept: URBAN - METROPOLITAN AREA CLINIC 27 | Facility: CLINIC | Age: 79
Setting detail: OPHTHALMOLOGY
End: 2024-08-12
Payer: COMMERCIAL

## 2024-08-12 DIAGNOSIS — H35.363: ICD-10-CM

## 2024-08-12 DIAGNOSIS — H02.403: ICD-10-CM

## 2024-08-12 DIAGNOSIS — E11.9: ICD-10-CM

## 2024-08-12 DIAGNOSIS — H25.13: ICD-10-CM

## 2024-08-12 DIAGNOSIS — H04.223: ICD-10-CM

## 2024-08-12 PROCEDURE — 99204 OFFICE O/P NEW MOD 45 MIN: CPT | Performed by: OPHTHALMOLOGY

## 2024-08-12 ASSESSMENT — CONFRONTATIONAL VISUAL FIELD TEST (CVF)
OS_FINDINGS: FULL
OD_FINDINGS: FULL

## 2024-08-12 ASSESSMENT — LID POSITION - PTOSIS
OS_PTOSIS: LUL 1+
OD_PTOSIS: RUL 2+

## 2024-09-11 ENCOUNTER — RX ONLY (RX ONLY)
Age: 79
End: 2024-09-11

## 2024-09-11 ENCOUNTER — OFFICE (OUTPATIENT)
Dept: URBAN - METROPOLITAN AREA CLINIC 27 | Facility: CLINIC | Age: 79
Setting detail: OPHTHALMOLOGY
End: 2024-09-11
Payer: COMMERCIAL

## 2024-09-11 DIAGNOSIS — H04.553: ICD-10-CM

## 2024-09-11 DIAGNOSIS — H04.223: ICD-10-CM

## 2024-09-11 PROBLEM — H04.301 DACRYOCYSTITIS, UNSPECIFIED; RIGHT EYE: Status: ACTIVE | Noted: 2024-09-11

## 2024-09-11 PROCEDURE — 68840 EXPLORE/IRRIGATE TEAR DUCTS: CPT | Mod: 50 | Performed by: OPHTHALMOLOGY

## 2024-09-11 PROCEDURE — 92012 INTRM OPH EXAM EST PATIENT: CPT | Mod: 25 | Performed by: OPHTHALMOLOGY

## 2024-09-11 ASSESSMENT — CONFRONTATIONAL VISUAL FIELD TEST (CVF)
OS_FINDINGS: FULL
OD_FINDINGS: FULL

## 2024-09-11 ASSESSMENT — LID POSITION - DERMATOCHALASIS
OS_DERMATOCHALASIS: LUL
OD_DERMATOCHALASIS: RUL

## 2024-11-07 ENCOUNTER — APPOINTMENT (OUTPATIENT)
Dept: OTOLARYNGOLOGY | Facility: CLINIC | Age: 79
End: 2024-11-07
Payer: MEDICARE

## 2024-11-07 VITALS
DIASTOLIC BLOOD PRESSURE: 79 MMHG | BODY MASS INDEX: 23.22 KG/M2 | WEIGHT: 123 LBS | SYSTOLIC BLOOD PRESSURE: 125 MMHG | HEART RATE: 74 BPM | HEIGHT: 61 IN

## 2024-11-07 DIAGNOSIS — H04.69 OTHER CHANGES OF LACRIMAL PASSAGES: ICD-10-CM

## 2024-11-07 DIAGNOSIS — Z86.39 PERSONAL HISTORY OF OTHER ENDOCRINE, NUTRITIONAL AND METABOLIC DISEASE: ICD-10-CM

## 2024-11-07 DIAGNOSIS — J34.1 CYST AND MUCOCELE OF NOSE AND NASAL SINUS: ICD-10-CM

## 2024-11-07 DIAGNOSIS — Z86.79 PERSONAL HISTORY OF OTHER DISEASES OF THE CIRCULATORY SYSTEM: ICD-10-CM

## 2024-11-07 PROCEDURE — 99204 OFFICE O/P NEW MOD 45 MIN: CPT | Mod: 25

## 2024-11-07 PROCEDURE — 31231 NASAL ENDOSCOPY DX: CPT

## 2024-11-07 RX ORDER — METFORMIN HYDROCHLORIDE 625 MG/1
TABLET ORAL
Refills: 0 | Status: ACTIVE | COMMUNITY

## 2024-11-07 RX ORDER — ROSUVASTATIN CALCIUM 5 MG/1
TABLET, FILM COATED ORAL
Refills: 0 | Status: ACTIVE | COMMUNITY

## 2024-11-27 ENCOUNTER — APPOINTMENT (OUTPATIENT)
Dept: CT IMAGING | Facility: CLINIC | Age: 79
End: 2024-11-27

## 2024-11-27 ENCOUNTER — OUTPATIENT (OUTPATIENT)
Dept: OUTPATIENT SERVICES | Facility: HOSPITAL | Age: 79
LOS: 1 days | End: 2024-11-27
Payer: MEDICARE

## 2024-11-27 DIAGNOSIS — H04.69 OTHER CHANGES OF LACRIMAL PASSAGES: ICD-10-CM

## 2024-11-27 PROCEDURE — 70487 CT MAXILLOFACIAL W/DYE: CPT

## 2024-11-27 PROCEDURE — 70487 CT MAXILLOFACIAL W/DYE: CPT | Mod: 26

## 2024-12-03 ENCOUNTER — NON-APPOINTMENT (OUTPATIENT)
Age: 79
End: 2024-12-03

## 2024-12-03 ENCOUNTER — APPOINTMENT (OUTPATIENT)
Dept: OPHTHALMOLOGY | Facility: CLINIC | Age: 79
End: 2024-12-03
Payer: MEDICARE

## 2024-12-03 PROCEDURE — 99204 OFFICE O/P NEW MOD 45 MIN: CPT

## 2024-12-03 PROCEDURE — 92285 EXTERNAL OCULAR PHOTOGRAPHY: CPT

## 2025-01-15 RX ORDER — AMOXICILLIN AND CLAVULANATE POTASSIUM 875; 125 MG/1; MG/1
875-125 TABLET, COATED ORAL
Qty: 20 | Refills: 0 | Status: ACTIVE | COMMUNITY
Start: 2025-01-15 | End: 1900-01-01

## 2025-01-28 ENCOUNTER — APPOINTMENT (OUTPATIENT)
Dept: OTOLARYNGOLOGY | Facility: CLINIC | Age: 80
End: 2025-01-28
Payer: MEDICARE

## 2025-01-28 DIAGNOSIS — H04.69 OTHER CHANGES OF LACRIMAL PASSAGES: ICD-10-CM

## 2025-01-28 DIAGNOSIS — J34.1 CYST AND MUCOCELE OF NOSE AND NASAL SINUS: ICD-10-CM

## 2025-01-28 PROCEDURE — 31231 NASAL ENDOSCOPY DX: CPT

## 2025-01-28 PROCEDURE — 99214 OFFICE O/P EST MOD 30 MIN: CPT | Mod: 25

## 2025-01-28 RX ORDER — MUPIROCIN 20 MG/G
2 OINTMENT TOPICAL
Qty: 1 | Refills: 5 | Status: ACTIVE | COMMUNITY
Start: 2025-01-28 | End: 1900-01-01

## 2025-02-03 ENCOUNTER — APPOINTMENT (OUTPATIENT)
Dept: OPHTHALMOLOGY | Facility: HOSPITAL | Age: 80
End: 2025-02-03

## 2025-02-03 RX ORDER — SULFAMETHOXAZOLE AND TRIMETHOPRIM 800; 160 MG/1; MG/1
800-160 TABLET ORAL TWICE DAILY
Qty: 20 | Refills: 0 | Status: ACTIVE | COMMUNITY
Start: 2025-02-03 | End: 1900-01-01

## 2025-02-06 LAB — EAR NOSE AND THROAT CULTURE: ABNORMAL

## 2025-02-11 ENCOUNTER — APPOINTMENT (OUTPATIENT)
Dept: OTOLARYNGOLOGY | Facility: CLINIC | Age: 80
End: 2025-02-11

## 2025-03-04 ENCOUNTER — APPOINTMENT (OUTPATIENT)
Dept: OTOLARYNGOLOGY | Facility: CLINIC | Age: 80
End: 2025-03-04

## 2025-04-17 ENCOUNTER — APPOINTMENT (OUTPATIENT)
Dept: OTOLARYNGOLOGY | Facility: CLINIC | Age: 80
End: 2025-04-17

## 2025-04-25 ENCOUNTER — APPOINTMENT (OUTPATIENT)
Dept: OTOLARYNGOLOGY | Facility: HOSPITAL | Age: 80
End: 2025-04-25

## 2025-05-19 ENCOUNTER — NON-APPOINTMENT (OUTPATIENT)
Age: 80
End: 2025-05-19

## 2025-05-21 ENCOUNTER — LABORATORY RESULT (OUTPATIENT)
Age: 80
End: 2025-05-21

## 2025-05-21 ENCOUNTER — NON-APPOINTMENT (OUTPATIENT)
Age: 80
End: 2025-05-21

## 2025-05-21 ENCOUNTER — APPOINTMENT (OUTPATIENT)
Dept: CARDIOLOGY | Facility: CLINIC | Age: 80
End: 2025-05-21
Payer: MEDICARE

## 2025-05-21 VITALS
WEIGHT: 121 LBS | SYSTOLIC BLOOD PRESSURE: 120 MMHG | OXYGEN SATURATION: 98 % | TEMPERATURE: 97.2 F | BODY MASS INDEX: 23.75 KG/M2 | HEART RATE: 75 BPM | HEIGHT: 60 IN | RESPIRATION RATE: 16 BRPM | DIASTOLIC BLOOD PRESSURE: 82 MMHG

## 2025-05-21 DIAGNOSIS — E11.9 TYPE 2 DIABETES MELLITUS W/OUT COMPLICATIONS: ICD-10-CM

## 2025-05-21 DIAGNOSIS — R01.1 CARDIAC MURMUR, UNSPECIFIED: ICD-10-CM

## 2025-05-21 DIAGNOSIS — E78.00 PURE HYPERCHOLESTEROLEMIA, UNSPECIFIED: ICD-10-CM

## 2025-05-21 PROCEDURE — G2211 COMPLEX E/M VISIT ADD ON: CPT

## 2025-05-21 PROCEDURE — 99203 OFFICE O/P NEW LOW 30 MIN: CPT

## 2025-05-21 PROCEDURE — 93000 ELECTROCARDIOGRAM COMPLETE: CPT

## 2025-06-16 ENCOUNTER — OUTPATIENT (OUTPATIENT)
Dept: OUTPATIENT SERVICES | Facility: HOSPITAL | Age: 80
LOS: 1 days | End: 2025-06-16

## 2025-06-16 VITALS
HEIGHT: 60 IN | OXYGEN SATURATION: 99 % | TEMPERATURE: 98 F | HEART RATE: 76 BPM | RESPIRATION RATE: 16 BRPM | WEIGHT: 121.92 LBS | DIASTOLIC BLOOD PRESSURE: 84 MMHG | SYSTOLIC BLOOD PRESSURE: 143 MMHG

## 2025-06-16 DIAGNOSIS — J34.1 CYST AND MUCOCELE OF NOSE AND NASAL SINUS: ICD-10-CM

## 2025-06-16 DIAGNOSIS — E11.9 TYPE 2 DIABETES MELLITUS WITHOUT COMPLICATIONS: ICD-10-CM

## 2025-06-16 RX ORDER — SODIUM CHLORIDE 9 G/1000ML
1000 INJECTION, SOLUTION INTRAVENOUS
Refills: 0 | Status: DISCONTINUED | OUTPATIENT
Start: 2025-06-23 | End: 2025-07-07

## 2025-06-16 NOTE — H&P PST ADULT - NEGATIVE ENDOCRINE SYMPTOMS
[address] [facility] coding opportunities       Chart reviewed, no opportunity found: CHART REVIEWED, NO OPPORTUNITY FOUND     Patients Insurance     Commercial Insurance: Venkat Juárez
no cold intolerance/no heat intolerance

## 2025-06-16 NOTE — H&P PST ADULT - OPHTHALMOLOGIC COMMENTS
uses eye glasses uses eye glasses, right eye swelling and tearing due to right nasolacrimal duct stenosis

## 2025-06-16 NOTE — H&P PST ADULT - NSICDXPASTMEDICALHX_GEN_ALL_CORE_FT
PAST MEDICAL HISTORY:  Diabetes mellitus     H/O chronic sinusitis     H/O osteopenia     High cholesterol     Osteoporosis      PAST MEDICAL HISTORY:  Diabetes mellitus     H/O chronic sinusitis     H/O osteopenia     High cholesterol     Nasal sinus cyst     Osteoporosis     Stenosis of nasolacrimal duct, right

## 2025-06-16 NOTE — H&P PST ADULT - PROBLEM SELECTOR PLAN 1
79 y/o F with h/o chronic sinusitis presents for preop eval to have endoscopic sinus surgery septoplasty on 6/23/25.     no Labs done at pst.  Pre-op instructions given, patient verbalized understanding. 81 y/o F with h/o chronic sinusitis presents for preop eval to have endoscopic sinus surgery, septoplasty on 6/23/25.   no Labs done at pst.  Pre-op instructions given, patient verbalized understanding.

## 2025-06-16 NOTE — H&P PST ADULT - ASSESSMENT
81 y/o F with h/o chronic sinusitis presents for preop eval to have endoscopic sinus surgery septoplasty on 6/23/25.

## 2025-06-16 NOTE — H&P PST ADULT - HISTORY OF PRESENT ILLNESS
81 y/o F with h/o chronic sinusitis, 3+ years of frequent right eye epiphora, Swelling under the right eye and drainage  since Jan 2025. Pt presents for preop eval to have endoscopic sinus surgery septoplasty on 6/23/25.    81 y/o F with h/o stenosis of right nasolacrimal duct, cyst of nasal sinus, 3+ years of frequent right eye epiphora, Swelling under the right eye and drainage  since Jan 2025. Pt presents for preop eval to have endoscopic sinus surgery septoplasty on 6/23/25.

## 2025-06-16 NOTE — H&P PST ADULT - NEGATIVE ENMT SYMPTOMS
DME [facility]:  [phone] - [place]:  or [phone] (ext [phone])- [place]    -----------------------------      Will follow up in 7 weeks.  
no hearing difficulty/no ear pain/no vertigo

## 2025-06-19 PROBLEM — H04.551 ACQUIRED STENOSIS OF RIGHT NASOLACRIMAL DUCT: Chronic | Status: ACTIVE | Noted: 2025-06-16

## 2025-06-19 PROBLEM — J34.1 CYST AND MUCOCELE OF NOSE AND NASAL SINUS: Chronic | Status: ACTIVE | Noted: 2025-06-16

## 2025-06-20 PROBLEM — Z87.39 PERSONAL HISTORY OF OTHER DISEASES OF THE MUSCULOSKELETAL SYSTEM AND CONNECTIVE TISSUE: Chronic | Status: ACTIVE | Noted: 2025-06-16

## 2025-06-20 NOTE — ASU PATIENT PROFILE, ADULT - NSICDXPASTMEDICALHX_GEN_ALL_CORE_FT
PAST MEDICAL HISTORY:  Diabetes mellitus     H/O chronic sinusitis     H/O osteopenia     High cholesterol     Nasal sinus cyst     Osteoporosis     Stenosis of nasolacrimal duct, right

## 2025-06-20 NOTE — ASU PATIENT PROFILE, ADULT - FALL HARM RISK - UNIVERSAL INTERVENTIONS
Bed in lowest position, wheels locked, appropriate side rails in place/Call bell, personal items and telephone in reach/Instruct patient to call for assistance before getting out of bed or chair/Non-slip footwear when patient is out of bed/[place] to call system/Physically safe environment - no spills, clutter or unnecessary equipment/Purposeful Proactive Rounding/Room/bathroom lighting operational, light cord in reach

## 2025-06-23 ENCOUNTER — TRANSCRIPTION ENCOUNTER (OUTPATIENT)
Age: 80
End: 2025-06-23

## 2025-06-23 ENCOUNTER — RESULT REVIEW (OUTPATIENT)
Age: 80
End: 2025-06-23

## 2025-06-23 ENCOUNTER — OUTPATIENT (OUTPATIENT)
Dept: INPATIENT UNIT | Facility: HOSPITAL | Age: 80
LOS: 1 days | End: 2025-06-23
Payer: MEDICARE

## 2025-06-23 ENCOUNTER — APPOINTMENT (OUTPATIENT)
Dept: OTOLARYNGOLOGY | Facility: HOSPITAL | Age: 80
End: 2025-06-23

## 2025-06-23 VITALS — RESPIRATION RATE: 20 BRPM | HEART RATE: 67 BPM | OXYGEN SATURATION: 99 %

## 2025-06-23 VITALS
HEART RATE: 79 BPM | RESPIRATION RATE: 14 BRPM | TEMPERATURE: 98 F | HEIGHT: 60 IN | DIASTOLIC BLOOD PRESSURE: 79 MMHG | OXYGEN SATURATION: 99 % | SYSTOLIC BLOOD PRESSURE: 133 MMHG | WEIGHT: 121.92 LBS

## 2025-06-23 DIAGNOSIS — J34.1 CYST AND MUCOCELE OF NOSE AND NASAL SINUS: ICD-10-CM

## 2025-06-23 LAB — GLUCOSE BLDC GLUCOMTR-MCNC: 121 MG/DL — HIGH (ref 70–99)

## 2025-06-23 PROCEDURE — 31287 NASAL/SINUS ENDOSCOPY SURG: CPT | Mod: LT

## 2025-06-23 PROCEDURE — 68815 PROBE NASOLACRIMAL DUCT: CPT | Mod: RT

## 2025-06-23 PROCEDURE — 31267 ENDOSCOPY MAXILLARY SINUS: CPT | Mod: RT

## 2025-06-23 PROCEDURE — 31253 NSL/SINS NDSC TOTAL: CPT

## 2025-06-23 PROCEDURE — 31239 NSL/SINUS ENDOSCOPY SURG DCR: CPT | Mod: RT

## 2025-06-23 PROCEDURE — 61782 SCAN PROC CRANIAL EXTRA: CPT

## 2025-06-23 PROCEDURE — 88305 TISSUE EXAM BY PATHOLOGIST: CPT | Mod: 26

## 2025-06-23 DEVICE — IMPLANTABLE DEVICE: Type: IMPLANTABLE DEVICE | Site: RIGHT | Status: FUNCTIONAL

## 2025-06-23 DEVICE — SURGIFLO MATRIX WITH THROMBIN KIT: Type: IMPLANTABLE DEVICE | Site: RIGHT | Status: FUNCTIONAL

## 2025-06-23 DEVICE — STENT SINUS DRUG ELUDING PROPEL CONTOUR: Type: IMPLANTABLE DEVICE | Site: RIGHT | Status: FUNCTIONAL

## 2025-06-23 DEVICE — SPLINT INTRANASAL POSISEP X 0.6X2IN: Type: IMPLANTABLE DEVICE | Site: RIGHT | Status: FUNCTIONAL

## 2025-06-23 DEVICE — SLEEVE FIXATION TYPE 70 3/BX: Type: IMPLANTABLE DEVICE | Site: RIGHT | Status: FUNCTIONAL

## 2025-06-23 DEVICE — PURAGEL 3ML: Type: IMPLANTABLE DEVICE | Site: RIGHT | Status: FUNCTIONAL

## 2025-06-23 RX ORDER — HYDROMORPHONE/SOD CHLOR,ISO/PF 2 MG/10 ML
0.25 SYRINGE (ML) INJECTION
Refills: 0 | Status: DISCONTINUED | OUTPATIENT
Start: 2025-06-23 | End: 2025-06-23

## 2025-06-23 RX ORDER — OXYCODONE HYDROCHLORIDE 30 MG/1
5 TABLET ORAL ONCE
Refills: 0 | Status: DISCONTINUED | OUTPATIENT
Start: 2025-06-23 | End: 2025-06-23

## 2025-06-23 RX ORDER — ONDANSETRON HCL/PF 4 MG/2 ML
4 VIAL (ML) INJECTION ONCE
Refills: 0 | Status: DISCONTINUED | OUTPATIENT
Start: 2025-06-23 | End: 2025-07-07

## 2025-06-23 RX ADMIN — OXYCODONE HYDROCHLORIDE 5 MILLIGRAM(S): 30 TABLET ORAL at 13:45

## 2025-06-23 RX ADMIN — OXYCODONE HYDROCHLORIDE 5 MILLIGRAM(S): 30 TABLET ORAL at 13:20

## 2025-06-23 NOTE — ASU DISCHARGE PLAN (ADULT/PEDIATRIC) - CARE PROVIDER_API CALL
Pa Farias  Otolaryngology Head And Neck Surgery  [address] [place] [address]  Phone: [phone]  Fax: [phone]  Follow Up Time:

## 2025-06-23 NOTE — ASU DISCHARGE PLAN (ADULT/PEDIATRIC) - NURSING INSTRUCTIONS
You received IV Tylenol for pain management at 11:45AM. Please DO NOT take any Tylenol (Acetaminophen) containing products, such as Vicodin, Percocet, Excedrin, and cold medications for the next 6 hours (until 5:45PM). DO NOT TAKE MORE THAN 4000 MG OF TYLENOL in a 24 hour period.

## 2025-06-30 LAB — SURGICAL PATHOLOGY STUDY: SIGNIFICANT CHANGE UP

## 2025-07-01 ENCOUNTER — NON-APPOINTMENT (OUTPATIENT)
Age: 80
End: 2025-07-01

## 2025-07-01 ENCOUNTER — APPOINTMENT (OUTPATIENT)
Dept: OPHTHALMOLOGY | Facility: CLINIC | Age: 80
End: 2025-07-01

## 2025-07-01 ENCOUNTER — APPOINTMENT (OUTPATIENT)
Dept: OTOLARYNGOLOGY | Facility: CLINIC | Age: 80
End: 2025-07-01

## 2025-07-08 NOTE — ASU DISCHARGE PLAN (ADULT/PEDIATRIC) - FINANCIAL ASSISTANCE
[facility] provides services at a reduced cost to those who are determined to be eligible through [facility]’s financial assistance program. Information regarding [facility]’s financial assistance program can be found by going to [email or web address] or by calling [phone]. 07-Jul-2025 19:09

## 2025-07-09 ENCOUNTER — APPOINTMENT (OUTPATIENT)
Dept: OTOLARYNGOLOGY | Facility: CLINIC | Age: 80
End: 2025-07-09
Payer: MEDICARE

## 2025-07-09 ENCOUNTER — APPOINTMENT (OUTPATIENT)
Dept: OTOLARYNGOLOGY | Facility: CLINIC | Age: 80
End: 2025-07-09

## 2025-07-09 VITALS
BODY MASS INDEX: 23.95 KG/M2 | SYSTOLIC BLOOD PRESSURE: 122 MMHG | DIASTOLIC BLOOD PRESSURE: 77 MMHG | OXYGEN SATURATION: 98 % | HEIGHT: 60 IN | WEIGHT: 122 LBS | HEART RATE: 76 BPM

## 2025-07-09 PROCEDURE — 31237 NSL/SINS NDSC SURG BX POLYPC: CPT | Mod: 50

## 2025-07-09 PROCEDURE — 99024 POSTOP FOLLOW-UP VISIT: CPT

## 2025-07-24 ENCOUNTER — APPOINTMENT (OUTPATIENT)
Dept: CARDIOLOGY | Facility: CLINIC | Age: 80
End: 2025-07-24
Payer: MEDICARE

## 2025-07-24 DIAGNOSIS — I65.29 OCCLUSION AND STENOSIS OF UNSPECIFIED CAROTID ARTERY: ICD-10-CM

## 2025-07-24 PROCEDURE — 93880 EXTRACRANIAL BILAT STUDY: CPT

## 2025-07-24 PROCEDURE — 93306 TTE W/DOPPLER COMPLETE: CPT

## 2025-07-29 ENCOUNTER — APPOINTMENT (OUTPATIENT)
Dept: OTOLARYNGOLOGY | Facility: CLINIC | Age: 80
End: 2025-07-29
Payer: MEDICARE

## 2025-07-29 ENCOUNTER — NON-APPOINTMENT (OUTPATIENT)
Age: 80
End: 2025-07-29

## 2025-07-29 VITALS
HEART RATE: 74 BPM | WEIGHT: 122 LBS | OXYGEN SATURATION: 96 % | SYSTOLIC BLOOD PRESSURE: 109 MMHG | HEIGHT: 60 IN | BODY MASS INDEX: 23.95 KG/M2 | DIASTOLIC BLOOD PRESSURE: 69 MMHG

## 2025-07-29 DIAGNOSIS — H04.69 OTHER CHANGES OF LACRIMAL PASSAGES: ICD-10-CM

## 2025-07-29 DIAGNOSIS — J32.9 CHRONIC SINUSITIS, UNSPECIFIED: ICD-10-CM

## 2025-07-29 PROCEDURE — 99024 POSTOP FOLLOW-UP VISIT: CPT

## 2025-07-29 PROCEDURE — 31237 NSL/SINS NDSC SURG BX POLYPC: CPT | Mod: RT

## 2025-07-31 PROBLEM — J32.9 CHRONIC SINUSITIS: Status: ACTIVE | Noted: 2025-07-09

## 2025-08-04 PROBLEM — I65.29 CAROTID ARTERY PLAQUE: Status: ACTIVE | Noted: 2025-06-24

## 2025-09-09 ENCOUNTER — APPOINTMENT (OUTPATIENT)
Dept: OTOLARYNGOLOGY | Facility: CLINIC | Age: 80
End: 2025-09-09
Payer: MEDICARE

## 2025-09-09 VITALS
BODY MASS INDEX: 23.95 KG/M2 | SYSTOLIC BLOOD PRESSURE: 151 MMHG | HEIGHT: 60 IN | HEART RATE: 86 BPM | OXYGEN SATURATION: 97 % | WEIGHT: 122 LBS | DIASTOLIC BLOOD PRESSURE: 92 MMHG

## 2025-09-09 DIAGNOSIS — J32.9 CHRONIC SINUSITIS, UNSPECIFIED: ICD-10-CM

## 2025-09-09 DIAGNOSIS — H04.69 OTHER CHANGES OF LACRIMAL PASSAGES: ICD-10-CM

## 2025-09-09 PROCEDURE — 31231 NASAL ENDOSCOPY DX: CPT

## 2025-09-09 PROCEDURE — 99024 POSTOP FOLLOW-UP VISIT: CPT

## 2025-09-09 RX ORDER — MOMETASONE FUROATE 100 %
POWDER (GRAM) MISCELLANEOUS
Qty: 1 | Refills: 3 | Status: ACTIVE | COMMUNITY
Start: 2025-09-09 | End: 1900-01-01

## (undated) DEVICE — WARMING BLANKET LOWER ADULT

## (undated) DEVICE — DRSG TELFA 3 X 8

## (undated) DEVICE — ELCTR BOVIE SUCTION 8FR 6"

## (undated) DEVICE — CANISTER DISPOSABLE THIN WALL 3000CC

## (undated) DEVICE — STRYKER COLORADO N-SERIES 3CM STRAIGHT

## (undated) DEVICE — DRSG SPLINT INTRA NASAL .5MM STANDARD THICK

## (undated) DEVICE — SUT CHROMIC 4-0 18" G-2

## (undated) DEVICE — MEDTRONIC AXIEM PATIENT TRACKER NON-INVASIVE

## (undated) DEVICE — SOL INJ NS 0.9% 500ML 1-PORT

## (undated) DEVICE — SOL IRR BAG NS 0.9% 50ML

## (undated) DEVICE — STRYKER SONOPET IQ TUBING SET

## (undated) DEVICE — NDL HYPO REGULAR BEVEL 25G X 1.5" (BLUE)

## (undated) DEVICE — BLADE MEDTRONIC ENT TRICUT ROTATABLE STRAIGHT 4MM X 11CM

## (undated) DEVICE — STRYKER SONOPET IQ TIP 12CM APEX 360

## (undated) DEVICE — Device

## (undated) DEVICE — WARMING BLANKET FULL UNDERBODY

## (undated) DEVICE — SOL ANTI FOG (FRED)

## (undated) DEVICE — BEAVER BLADE MINI LAMELLAR 60 DEG BEVEL UP (BLACK)

## (undated) DEVICE — SOL IRR POUR NS 0.9% 500ML

## (undated) DEVICE — SUT PLAIN GUT 4-0 18" SC-1

## (undated) DEVICE — MEDTRONIC INSTRUMENT TRACKER ENT

## (undated) DEVICE — SUT ETHILON 3-0 30" FS-1

## (undated) DEVICE — KNIFE ALCON SLIT INTREPID SINGLE BEVEL ANGLED 2.4MM (PURPLE)

## (undated) DEVICE — SOL BALANCE SALT 15ML

## (undated) DEVICE — GLV 6.5 PROTEXIS (WHITE)

## (undated) DEVICE — SYR CONTROL LUER LOK 10CC

## (undated) DEVICE — DRSG NASOPORE 4CM FIRM

## (undated) DEVICE — CLEANING SHEATH ENDO-SCRUB FOR STORZ 7210AA TELESCOPE 4MM 0 DEGREE

## (undated) DEVICE — STRYKER MALLEABLE SUCTION MEDIUM STANDARD

## (undated) DEVICE — CANNULA ANT CHMBR RYCROFT 30GX7/8

## (undated) DEVICE — DRAPE LIGHT HANDLE COVER (GREEN)

## (undated) DEVICE — LIJ-MEDTRONIC FUSION ENT NAVIGATION SET: Type: DURABLE MEDICAL EQUIPMENT

## (undated) DEVICE — SUCTION COAGULATOR HAND CONTROL 10FR X 6"

## (undated) DEVICE — BLADE MEDTRONIC ENT FUSION TRICUT ROTATABLE STRAIGHT 4MM X 13CM

## (undated) DEVICE — TUBING SUCTION NONCONDUCTIVE 6MM X 12FT

## (undated) DEVICE — SPEAR SURG EYE WECK-CELL CELOS

## (undated) DEVICE — PACK SMR

## (undated) DEVICE — POSITIONER STRAP ARMBOARD VELCRO TS-30

## (undated) DEVICE — TUBING IRRIGATION STRAIGHT SHOT

## (undated) DEVICE — LABELS BLANK W PEN

## (undated) DEVICE — PAD MEDTRONIC ENT ADHESIVE PAD

## (undated) DEVICE — NDL HYPO SAFE 25G X 5/8" (ORANGE)

## (undated) DEVICE — SYR LUER LOK 5CC

## (undated) DEVICE — ENDO SCRUB

## (undated) DEVICE — BLADE MEDTRONIC ENT RAD 40 DEGREE ROTATABLE 4MM X 11CM

## (undated) DEVICE — NDL HYPO NONSAFE 30G X 0.5" (BEIGE)

## (undated) DEVICE — POSITIONER FOAM EGG CRATE ULNAR 2PCS (PINK)

## (undated) DEVICE — VENODYNE/SCD SLEEVE CALF MEDIUM

## (undated) DEVICE — DRAPE INSTRUMENT POUCH 6.75" X 11"

## (undated) DEVICE — PROTECTOR CORNEAL BLUE ADULT

## (undated) DEVICE — DRAPE TOWEL BLUE STICKY

## (undated) DEVICE — SOL IRR POUR H2O 500ML

## (undated) DEVICE — STRYKER SONOPET IQ TIP 20CM APEX 360

## (undated) DEVICE — CATH IV SAFE INSYTE 14G X 1.75" (ORANGE)